# Patient Record
Sex: FEMALE | Race: WHITE | NOT HISPANIC OR LATINO | Employment: OTHER | URBAN - METROPOLITAN AREA
[De-identification: names, ages, dates, MRNs, and addresses within clinical notes are randomized per-mention and may not be internally consistent; named-entity substitution may affect disease eponyms.]

---

## 2017-01-16 PROBLEM — Z79.899 DRUG THERAPY: Status: ACTIVE | Noted: 2017-01-16

## 2017-01-31 RX ORDER — PHENOBARBITAL 32.4 MG/1
TABLET ORAL
Qty: 30 TABLET | Refills: 0 | OUTPATIENT
Start: 2017-01-31

## 2017-01-31 RX ORDER — PHENOBARBITAL 97.2 MG/1
TABLET ORAL
Qty: 30 TABLET | Refills: 0 | OUTPATIENT
Start: 2017-01-31

## 2017-03-01 RX ORDER — PHENOBARBITAL 32.4 MG/1
TABLET ORAL
Qty: 30 TABLET | Refills: 0 | OUTPATIENT
Start: 2017-03-01

## 2017-03-01 RX ORDER — PHENOBARBITAL 97.2 MG/1
TABLET ORAL
OUTPATIENT
Start: 2017-03-01

## 2017-03-28 RX ORDER — PHENOBARBITAL 97.2 MG/1
TABLET ORAL
Qty: 30 TABLET | Refills: 0 | OUTPATIENT
Start: 2017-03-28

## 2017-03-28 RX ORDER — LEVOTHYROXINE SODIUM 0.07 MG/1
TABLET ORAL
Qty: 30 TABLET | Refills: 0 | Status: SHIPPED | OUTPATIENT
Start: 2017-03-28 | End: 2017-04-05 | Stop reason: SDUPTHER

## 2017-03-28 RX ORDER — PHENOBARBITAL 32.4 MG/1
TABLET ORAL
Qty: 30 TABLET | Refills: 0 | OUTPATIENT
Start: 2017-03-28

## 2017-03-30 ENCOUNTER — TELEPHONE (OUTPATIENT)
Dept: FAMILY MEDICINE CLINIC | Facility: CLINIC | Age: 47
End: 2017-03-30

## 2017-04-02 NOTE — TELEPHONE ENCOUNTER
Ask her mother if there are any forms that need to be filled out by me. I can see her even if there are no forms. But is it very difficult as we all know.

## 2017-04-05 RX ORDER — LEVOTHYROXINE SODIUM 0.07 MG/1
75 TABLET ORAL DAILY
Qty: 90 TABLET | Refills: 10 | Status: SHIPPED | OUTPATIENT
Start: 2017-04-05 | End: 2018-05-17 | Stop reason: SDUPTHER

## 2017-04-21 ENCOUNTER — TELEPHONE (OUTPATIENT)
Dept: FAMILY MEDICINE CLINIC | Facility: CLINIC | Age: 47
End: 2017-04-21

## 2017-04-21 ENCOUNTER — OFFICE VISIT (OUTPATIENT)
Dept: FAMILY MEDICINE CLINIC | Facility: CLINIC | Age: 47
End: 2017-04-21

## 2017-04-21 DIAGNOSIS — G80.9 CEREBRAL PALSY, UNSPECIFIED TYPE (HCC): ICD-10-CM

## 2017-04-21 DIAGNOSIS — R56.9 SEIZURE (HCC): ICD-10-CM

## 2017-04-21 DIAGNOSIS — Q02 MICROCEPHALIC (HCC): ICD-10-CM

## 2017-04-21 DIAGNOSIS — E03.9 ACQUIRED HYPOTHYROIDISM: Primary | ICD-10-CM

## 2017-04-21 DIAGNOSIS — N91.2 AMENORRHEA: ICD-10-CM

## 2017-04-21 PROCEDURE — 99213 OFFICE O/P EST LOW 20 MIN: CPT | Performed by: FAMILY MEDICINE

## 2017-04-21 NOTE — TELEPHONE ENCOUNTER
----- Message from Yvonne Casarez sent at 4/21/2017  3:33 PM EDT -----  PATIENT'S LAST 3 CYCLES  MARCH 3-DAY 1   MARCH 24-DAY 1  APRIL 11-DAY 1

## 2017-04-21 NOTE — PROGRESS NOTES
Subjective   Nallely Ochoa is a 46 y.o. female.     History of Present Illness Here with sister Anay.  Menses are closer together 18-22 days apart. Last 3-4 days. Only heavy for one day./ Menarche age 14+.    Still will not let health care or other providers near her. But now will give a hug when it is time to leave.    The following portions of the patient's history were reviewed and updated as appropriate: allergies, current medications, past social history and problem list.    Review of Systems   Constitutional: Negative for activity change, appetite change and unexpected weight change.   HENT: Negative for nosebleeds and trouble swallowing.    Eyes: Negative for pain and visual disturbance.   Respiratory: Negative for chest tightness, shortness of breath and wheezing.    Cardiovascular: Negative for chest pain and palpitations.   Gastrointestinal: Negative for abdominal pain and blood in stool.   Endocrine: Negative.    Genitourinary: Negative for difficulty urinating and hematuria.        Menses now 3 weeks apart rather than 4.   Musculoskeletal: Negative for joint swelling.   Skin: Negative for color change and rash.   Allergic/Immunologic: Negative.    Neurological: Positive for seizures and speech difficulty (nonverbal entire life). Negative for syncope.   Hematological: Negative for adenopathy.   Psychiatric/Behavioral: Positive for agitation, behavioral problems, confusion, decreased concentration, dysphoric mood and self-injury (only when upset, not new). The patient is nervous/anxious.    All other systems reviewed and are negative.      Objective   There were no vitals taken for this visit.  Physical Exam   Constitutional: She appears well-developed and well-nourished.   Shrieking, rocking, hitting herself on the head. Nonverbal.   Cardiovascular: Normal rate, regular rhythm and normal heart sounds.    Pulmonary/Chest: Effort normal and breath sounds normal.   Neurological:   Nonverbal. Difficult  to control for phlebotomy so very little blood taken overall.   Psychiatric:   Severely frightened and angry entire visit other than 20 seconds when she managed to give a hug.   Nursing note and vitals reviewed.      Assessment/Plan   Problem List Items Addressed This Visit        Endocrine    Hypothyroidism - Primary       Nervous and Auditory    Cerebral palsy    Seizure    Microcephalic      Other Visit Diagnoses     Amenorrhea        Relevant Orders    Estrogens, Fractionated    FSH & LH           Really she has short menstrual cycle and could be nearing menopause.  She does not have amenorrhea.  Mother does an amazing job taking care of Nallely.

## 2017-04-24 ENCOUNTER — TELEPHONE (OUTPATIENT)
Dept: FAMILY MEDICINE CLINIC | Facility: CLINIC | Age: 47
End: 2017-04-24

## 2017-04-24 LAB
ESTRADIOL SERPL-MCNC: 514.7 PG/ML
ESTRONE SERPL-MCNC: 57 PG/ML
FSH SERPL-ACNC: 5.4 MIU/ML
LH SERPL-ACNC: 4.9 MIU/ML

## 2017-04-24 NOTE — TELEPHONE ENCOUNTER
Spoke with pts mother that the estrogen level is slightly higher than the normal range. Not common at any age, carlo age 46. Tomorrow I will speak with mother's gyne, Dr Carla Helms, and see what can and should be done. May need D and C. Dr Helms is not Nallely';s gyne at present, and may refer me elsewhere.

## 2017-04-25 DIAGNOSIS — N92.1 METRORRHAGIA: Primary | ICD-10-CM

## 2017-04-26 RX ORDER — PHENOBARBITAL 97.2 MG/1
TABLET ORAL
Qty: 30 TABLET | Refills: 0 | OUTPATIENT
Start: 2017-04-26

## 2017-04-26 RX ORDER — PHENOBARBITAL 32.4 MG/1
TABLET ORAL
Qty: 30 TABLET | Refills: 0 | OUTPATIENT
Start: 2017-04-26

## 2017-05-01 ENCOUNTER — TELEPHONE (OUTPATIENT)
Dept: OBSTETRICS AND GYNECOLOGY | Facility: CLINIC | Age: 47
End: 2017-05-01

## 2017-05-20 RX ORDER — PHENYTOIN 50 MG/1
TABLET, CHEWABLE ORAL
Qty: 90 TABLET | Refills: 0 | OUTPATIENT
Start: 2017-05-20

## 2017-05-22 RX ORDER — PHENYTOIN 50 MG/1
50 TABLET, CHEWABLE ORAL DAILY
Qty: 90 TABLET | Refills: 0 | Status: SHIPPED | OUTPATIENT
Start: 2017-05-22 | End: 2017-08-16 | Stop reason: SDUPTHER

## 2017-05-30 RX ORDER — PHENOBARBITAL 97.2 MG/1
TABLET ORAL
Qty: 30 TABLET | Refills: 0 | Status: SHIPPED | OUTPATIENT
Start: 2017-05-30 | End: 2017-06-24 | Stop reason: SDUPTHER

## 2017-05-30 RX ORDER — PHENOBARBITAL 32.4 MG/1
TABLET ORAL
Qty: 30 TABLET | Refills: 0 | Status: SHIPPED | OUTPATIENT
Start: 2017-05-30 | End: 2017-06-24 | Stop reason: SDUPTHER

## 2017-06-26 RX ORDER — PHENOBARBITAL 97.2 MG/1
TABLET ORAL
Qty: 30 TABLET | Refills: 0 | Status: SHIPPED | OUTPATIENT
Start: 2017-06-26 | End: 2018-01-23 | Stop reason: SDUPTHER

## 2017-06-26 RX ORDER — PHENOBARBITAL 32.4 MG/1
TABLET ORAL
Qty: 30 TABLET | Refills: 0 | Status: SHIPPED | OUTPATIENT
Start: 2017-06-26 | End: 2018-01-23 | Stop reason: SDUPTHER

## 2017-07-07 ENCOUNTER — OFFICE VISIT (OUTPATIENT)
Dept: OBSTETRICS AND GYNECOLOGY | Facility: CLINIC | Age: 47
End: 2017-07-07

## 2017-07-07 VITALS — HEIGHT: 59 IN | WEIGHT: 125 LBS | BODY MASS INDEX: 25.2 KG/M2

## 2017-07-07 DIAGNOSIS — G80.9 CEREBRAL PALSY, UNSPECIFIED TYPE (HCC): ICD-10-CM

## 2017-07-07 DIAGNOSIS — N93.9 ABNORMAL UTERINE BLEEDING: Primary | ICD-10-CM

## 2017-07-07 PROCEDURE — 99203 OFFICE O/P NEW LOW 30 MIN: CPT | Performed by: OBSTETRICS & GYNECOLOGY

## 2017-07-07 NOTE — PROGRESS NOTES
Subjective   Nallely Ochoa is a 47 y.o. female     History of Present Illness  CC: NP here for irregular bleeding.     Patient is a 47-year-old G0 with a history of cerebral palsy that presents with an approximately 6-7 month history of abnormal uterine bleeding.  History is given by her mother.  Patient's mother states that she has noticed that her periods have become closer together since December.  She denies that the bleeding is heavy and states that this typically 3-4 days of light bleeding.  The patient's mother cares for her due to the patient's history of cerebral palsy and being wheelchair bound.  The mother states that hygiene during her periods is not an issue.  She denies that the patient complains of pain.  The patient has never been sexually active.  She states that she has had one pelvic exam under anesthesia in the past by her primary care provider.  LH and FSH were recently done by her primary care provider and do not indicate ovarian failure.  Her estradiol was noted to be high.    The following portions of the patient's history were reviewed and updated as appropriate: allergies, current medications, past family history, past medical history, past social history, past surgical history and problem list.    Review of Systems   Reason unable to perform ROS: Patient has cerebral palsy.   Genitourinary: Positive for menstrual problem.       Objective   Physical Exam   Vitals reviewed.   unable to perform physical exam due to patient's anxiety      Assessment/Plan   Diagnoses and all orders for this visit:    Abnormal uterine bleeding  -     Case Request    Cerebral palsy, unspecified type      Discussed with patient's mother recommendations to proceed with exam under anesthesia and endometrial biopsy.  Explained that biopsy is recommended after the age of 45 for abnormal bleeding to rule out any hyperplasia or cancer.  Recommend proceeding with exam under anesthesia, transvaginal ultrasound, Pap  smear, D&C, and possible hysteroscopy.  Discussed risk of the procedure with the patient's mother including risks for infection, bleeding, uterine perforation with damage to surrounding structures, need for additional surgery if uterine perforation occurs, risk of anesthesia, blood clots, heart attack, and stroke.  Patient's mother states that she has done well with anesthesia for her past procedures.  All questions were answered today and patient's mother agrees to proceed with the procedure.

## 2017-07-10 ENCOUNTER — TRANSCRIBE ORDERS (OUTPATIENT)
Dept: ADMINISTRATIVE | Facility: HOSPITAL | Age: 47
End: 2017-07-10

## 2017-07-10 DIAGNOSIS — N93.9 ABNORMAL UTERINE BLEEDING: Primary | ICD-10-CM

## 2017-07-14 ENCOUNTER — APPOINTMENT (OUTPATIENT)
Dept: PREADMISSION TESTING | Facility: HOSPITAL | Age: 47
End: 2017-07-14

## 2017-07-18 PROBLEM — N93.9 ABNORMAL UTERINE BLEEDING: Status: ACTIVE | Noted: 2017-07-18

## 2017-07-19 ENCOUNTER — HOSPITAL ENCOUNTER (OUTPATIENT)
Facility: HOSPITAL | Age: 47
Setting detail: HOSPITAL OUTPATIENT SURGERY
Discharge: HOME | End: 2017-07-19
Attending: OBSTETRICS & GYNECOLOGY | Admitting: OBSTETRICS & GYNECOLOGY

## 2017-07-19 ENCOUNTER — ANESTHESIA (OUTPATIENT)
Dept: PERIOP | Facility: HOSPITAL | Age: 47
End: 2017-07-19

## 2017-07-19 ENCOUNTER — ANESTHESIA EVENT (OUTPATIENT)
Dept: PERIOP | Facility: HOSPITAL | Age: 47
End: 2017-07-19

## 2017-07-19 ENCOUNTER — HOSPITAL ENCOUNTER (OUTPATIENT)
Dept: ULTRASOUND IMAGING | Facility: HOSPITAL | Age: 47
Discharge: HOME | End: 2017-07-19
Attending: OBSTETRICS & GYNECOLOGY

## 2017-07-19 VITALS
OXYGEN SATURATION: 98 % | BODY MASS INDEX: 26.21 KG/M2 | SYSTOLIC BLOOD PRESSURE: 149 MMHG | HEART RATE: 92 BPM | HEIGHT: 59 IN | TEMPERATURE: 97.8 F | RESPIRATION RATE: 16 BRPM | DIASTOLIC BLOOD PRESSURE: 97 MMHG | WEIGHT: 130 LBS

## 2017-07-19 DIAGNOSIS — N93.9 ABNORMAL UTERINE BLEEDING: ICD-10-CM

## 2017-07-19 DIAGNOSIS — N93.9 ABNORMAL UTERINE BLEEDING: Primary | ICD-10-CM

## 2017-07-19 PROCEDURE — 57500 BIOPSY OF CERVIX: CPT | Performed by: OBSTETRICS & GYNECOLOGY

## 2017-07-19 PROCEDURE — 88305 TISSUE EXAM BY PATHOLOGIST: CPT | Performed by: OBSTETRICS & GYNECOLOGY

## 2017-07-19 PROCEDURE — 25010000002 MIDAZOLAM PER 1 MG: Performed by: ANESTHESIOLOGY

## 2017-07-19 PROCEDURE — 25010000002 PROPOFOL 10 MG/ML EMULSION: Performed by: NURSE ANESTHETIST, CERTIFIED REGISTERED

## 2017-07-19 PROCEDURE — 25010000002 ONDANSETRON PER 1 MG: Performed by: NURSE ANESTHETIST, CERTIFIED REGISTERED

## 2017-07-19 PROCEDURE — S0260 H&P FOR SURGERY: HCPCS | Performed by: OBSTETRICS & GYNECOLOGY

## 2017-07-19 PROCEDURE — 25010000002 PROMETHAZINE PER 50 MG: Performed by: ANESTHESIOLOGY

## 2017-07-19 PROCEDURE — 76830 TRANSVAGINAL US NON-OB: CPT

## 2017-07-19 PROCEDURE — 58558 HYSTEROSCOPY BIOPSY: CPT | Performed by: OBSTETRICS & GYNECOLOGY

## 2017-07-19 RX ORDER — PROMETHAZINE HYDROCHLORIDE 25 MG/1
25 TABLET ORAL ONCE AS NEEDED
Status: DISCONTINUED | OUTPATIENT
Start: 2017-07-19 | End: 2017-07-19 | Stop reason: HOSPADM

## 2017-07-19 RX ORDER — PROMETHAZINE HYDROCHLORIDE 25 MG/ML
12.5 INJECTION, SOLUTION INTRAMUSCULAR; INTRAVENOUS ONCE AS NEEDED
Status: DISCONTINUED | OUTPATIENT
Start: 2017-07-19 | End: 2017-07-19 | Stop reason: HOSPADM

## 2017-07-19 RX ORDER — PROMETHAZINE HYDROCHLORIDE 25 MG/1
12.5 SUPPOSITORY RECTAL ONCE
Status: COMPLETED | OUTPATIENT
Start: 2017-07-19 | End: 2017-07-19

## 2017-07-19 RX ORDER — FLUMAZENIL 0.1 MG/ML
0.2 INJECTION INTRAVENOUS AS NEEDED
Status: DISCONTINUED | OUTPATIENT
Start: 2017-07-19 | End: 2017-07-19 | Stop reason: HOSPADM

## 2017-07-19 RX ORDER — HYDRALAZINE HYDROCHLORIDE 20 MG/ML
5 INJECTION INTRAMUSCULAR; INTRAVENOUS
Status: DISCONTINUED | OUTPATIENT
Start: 2017-07-19 | End: 2017-07-19 | Stop reason: HOSPADM

## 2017-07-19 RX ORDER — HYDROCODONE BITARTRATE AND ACETAMINOPHEN 7.5; 325 MG/1; MG/1
1 TABLET ORAL ONCE AS NEEDED
Status: DISCONTINUED | OUTPATIENT
Start: 2017-07-19 | End: 2017-07-19 | Stop reason: HOSPADM

## 2017-07-19 RX ORDER — KETAMINE HYDROCHLORIDE 50 MG/ML
250 INJECTION, SOLUTION, CONCENTRATE INTRAMUSCULAR; INTRAVENOUS ONCE
Status: COMPLETED | OUTPATIENT
Start: 2017-07-19 | End: 2017-07-19

## 2017-07-19 RX ORDER — ONDANSETRON 2 MG/ML
INJECTION INTRAMUSCULAR; INTRAVENOUS AS NEEDED
Status: DISCONTINUED | OUTPATIENT
Start: 2017-07-19 | End: 2017-07-19 | Stop reason: SURG

## 2017-07-19 RX ORDER — HYDROMORPHONE HYDROCHLORIDE 1 MG/ML
0.5 INJECTION, SOLUTION INTRAMUSCULAR; INTRAVENOUS; SUBCUTANEOUS
Status: DISCONTINUED | OUTPATIENT
Start: 2017-07-19 | End: 2017-07-19 | Stop reason: HOSPADM

## 2017-07-19 RX ORDER — MIDAZOLAM HYDROCHLORIDE 1 MG/ML
1 INJECTION INTRAMUSCULAR; INTRAVENOUS
Status: DISCONTINUED | OUTPATIENT
Start: 2017-07-19 | End: 2017-07-19 | Stop reason: HOSPADM

## 2017-07-19 RX ORDER — SODIUM CHLORIDE 9 MG/ML
INJECTION, SOLUTION INTRAVENOUS AS NEEDED
Status: DISCONTINUED | OUTPATIENT
Start: 2017-07-19 | End: 2017-07-19 | Stop reason: HOSPADM

## 2017-07-19 RX ORDER — PROMETHAZINE HYDROCHLORIDE 25 MG/1
12.5 TABLET ORAL ONCE
Status: COMPLETED | OUTPATIENT
Start: 2017-07-19 | End: 2017-07-19

## 2017-07-19 RX ORDER — LABETALOL HYDROCHLORIDE 5 MG/ML
5 INJECTION, SOLUTION INTRAVENOUS
Status: DISCONTINUED | OUTPATIENT
Start: 2017-07-19 | End: 2017-07-19 | Stop reason: HOSPADM

## 2017-07-19 RX ORDER — FAMOTIDINE 10 MG/ML
20 INJECTION, SOLUTION INTRAVENOUS
Status: DISCONTINUED | OUTPATIENT
Start: 2017-07-19 | End: 2017-07-19 | Stop reason: HOSPADM

## 2017-07-19 RX ORDER — SODIUM CHLORIDE 0.9 % (FLUSH) 0.9 %
1-10 SYRINGE (ML) INJECTION AS NEEDED
Status: DISCONTINUED | OUTPATIENT
Start: 2017-07-19 | End: 2017-07-19 | Stop reason: HOSPADM

## 2017-07-19 RX ORDER — PROMETHAZINE HYDROCHLORIDE 25 MG/ML
2.5 INJECTION, SOLUTION INTRAMUSCULAR; INTRAVENOUS
Status: DISCONTINUED | OUTPATIENT
Start: 2017-07-19 | End: 2017-07-19 | Stop reason: HOSPADM

## 2017-07-19 RX ORDER — MIDAZOLAM HYDROCHLORIDE 1 MG/ML
2 INJECTION INTRAMUSCULAR; INTRAVENOUS
Status: DISCONTINUED | OUTPATIENT
Start: 2017-07-19 | End: 2017-07-19 | Stop reason: HOSPADM

## 2017-07-19 RX ORDER — PROPOFOL 10 MG/ML
VIAL (ML) INTRAVENOUS AS NEEDED
Status: DISCONTINUED | OUTPATIENT
Start: 2017-07-19 | End: 2017-07-19 | Stop reason: SURG

## 2017-07-19 RX ORDER — MAGNESIUM HYDROXIDE 1200 MG/15ML
LIQUID ORAL AS NEEDED
Status: DISCONTINUED | OUTPATIENT
Start: 2017-07-19 | End: 2017-07-19 | Stop reason: HOSPADM

## 2017-07-19 RX ORDER — DIPHENHYDRAMINE HYDROCHLORIDE 50 MG/ML
12.5 INJECTION INTRAMUSCULAR; INTRAVENOUS
Status: DISCONTINUED | OUTPATIENT
Start: 2017-07-19 | End: 2017-07-19 | Stop reason: HOSPADM

## 2017-07-19 RX ORDER — EPHEDRINE SULFATE 50 MG/ML
5 INJECTION, SOLUTION INTRAVENOUS ONCE AS NEEDED
Status: DISCONTINUED | OUTPATIENT
Start: 2017-07-19 | End: 2017-07-19 | Stop reason: HOSPADM

## 2017-07-19 RX ORDER — PROMETHAZINE HYDROCHLORIDE 25 MG/1
12.5 TABLET ORAL ONCE AS NEEDED
Status: DISCONTINUED | OUTPATIENT
Start: 2017-07-19 | End: 2017-07-19 | Stop reason: HOSPADM

## 2017-07-19 RX ORDER — NALOXONE HCL 0.4 MG/ML
0.2 VIAL (ML) INJECTION AS NEEDED
Status: DISCONTINUED | OUTPATIENT
Start: 2017-07-19 | End: 2017-07-19 | Stop reason: HOSPADM

## 2017-07-19 RX ORDER — FENTANYL CITRATE 50 UG/ML
50 INJECTION, SOLUTION INTRAMUSCULAR; INTRAVENOUS
Status: DISCONTINUED | OUTPATIENT
Start: 2017-07-19 | End: 2017-07-19 | Stop reason: HOSPADM

## 2017-07-19 RX ORDER — OXYCODONE AND ACETAMINOPHEN 7.5; 325 MG/1; MG/1
1 TABLET ORAL ONCE AS NEEDED
Status: DISCONTINUED | OUTPATIENT
Start: 2017-07-19 | End: 2017-07-19 | Stop reason: HOSPADM

## 2017-07-19 RX ORDER — IBUPROFEN 600 MG/1
600 TABLET ORAL EVERY 6 HOURS PRN
Status: DISCONTINUED | OUTPATIENT
Start: 2017-07-19 | End: 2017-07-19 | Stop reason: HOSPADM

## 2017-07-19 RX ORDER — PROMETHAZINE HYDROCHLORIDE 25 MG/ML
12.5 INJECTION, SOLUTION INTRAMUSCULAR; INTRAVENOUS ONCE
Status: COMPLETED | OUTPATIENT
Start: 2017-07-19 | End: 2017-07-19

## 2017-07-19 RX ORDER — PROMETHAZINE HYDROCHLORIDE 25 MG/1
25 SUPPOSITORY RECTAL ONCE AS NEEDED
Status: DISCONTINUED | OUTPATIENT
Start: 2017-07-19 | End: 2017-07-19 | Stop reason: HOSPADM

## 2017-07-19 RX ORDER — SODIUM CHLORIDE, SODIUM LACTATE, POTASSIUM CHLORIDE, CALCIUM CHLORIDE 600; 310; 30; 20 MG/100ML; MG/100ML; MG/100ML; MG/100ML
9 INJECTION, SOLUTION INTRAVENOUS CONTINUOUS PRN
Status: DISCONTINUED | OUTPATIENT
Start: 2017-07-19 | End: 2017-07-19 | Stop reason: HOSPADM

## 2017-07-19 RX ORDER — ONDANSETRON 2 MG/ML
4 INJECTION INTRAMUSCULAR; INTRAVENOUS ONCE AS NEEDED
Status: DISCONTINUED | OUTPATIENT
Start: 2017-07-19 | End: 2017-07-19 | Stop reason: HOSPADM

## 2017-07-19 RX ORDER — KETAMINE HYDROCHLORIDE 50 MG/ML
200 INJECTION, SOLUTION, CONCENTRATE INTRAMUSCULAR; INTRAVENOUS ONCE
Status: COMPLETED | OUTPATIENT
Start: 2017-07-19 | End: 2017-07-19

## 2017-07-19 RX ADMIN — SODIUM CHLORIDE, POTASSIUM CHLORIDE, SODIUM LACTATE AND CALCIUM CHLORIDE 9 ML/HR: 600; 310; 30; 20 INJECTION, SOLUTION INTRAVENOUS at 07:36

## 2017-07-19 RX ADMIN — FAMOTIDINE 20 MG: 10 INJECTION INTRAVENOUS at 07:39

## 2017-07-19 RX ADMIN — MIDAZOLAM 1 MG: 1 INJECTION INTRAMUSCULAR; INTRAVENOUS at 08:03

## 2017-07-19 RX ADMIN — MIDAZOLAM 1 MG: 1 INJECTION INTRAMUSCULAR; INTRAVENOUS at 08:07

## 2017-07-19 RX ADMIN — ONDANSETRON 4 MG: 2 INJECTION INTRAMUSCULAR; INTRAVENOUS at 10:05

## 2017-07-19 RX ADMIN — MIDAZOLAM 1 MG: 1 INJECTION INTRAMUSCULAR; INTRAVENOUS at 07:51

## 2017-07-19 RX ADMIN — KETAMINE HYDROCHLORIDE 250 MG: 50 INJECTION, SOLUTION INTRAMUSCULAR; INTRAVENOUS at 07:18

## 2017-07-19 RX ADMIN — MIDAZOLAM 1 MG: 1 INJECTION INTRAMUSCULAR; INTRAVENOUS at 07:37

## 2017-07-19 RX ADMIN — PROMETHAZINE HYDROCHLORIDE 12.5 MG: 25 INJECTION, SOLUTION INTRAMUSCULAR; INTRAVENOUS at 08:21

## 2017-07-19 RX ADMIN — KETAMINE HYDROCHLORIDE 200 MG: 50 INJECTION, SOLUTION INTRAMUSCULAR; INTRAVENOUS at 09:02

## 2017-07-19 RX ADMIN — PROPOFOL 150 MG: 10 INJECTION, EMULSION INTRAVENOUS at 09:27

## 2017-07-19 RX ADMIN — PROPOFOL 50 MG: 10 INJECTION, EMULSION INTRAVENOUS at 09:30

## 2017-07-19 NOTE — ANESTHESIA PROCEDURE NOTES
Airway  Urgency: elective    Date/Time: 7/19/2017 9:28 AM  Airway not difficult    General Information and Staff    Patient location during procedure: OR  Anesthesiologist: SHELBY PRINCE  CRNA: CHITO YING    Indications and Patient Condition  Indications for airway management: airway protection    Preoxygenated: yes  Mask difficulty assessment: 1 - vent by mask    Final Airway Details  Final airway type: supraglottic airway      Successful airway: unique  Size 3    Number of attempts at approach: 1

## 2017-07-19 NOTE — PERIOPERATIVE NURSING NOTE
lpatient anxious and mother at bedside stated getting her home the fastest way possible is what she is requesting

## 2017-07-19 NOTE — PERIOPERATIVE NURSING NOTE
"PT VERY AGITATED  AND UNCOOPERATIVE WITH ALL CARE. DR. PRINCE HERE. ADMINISTERED \"250 MG\" OF KETAMINE RIGHT THIGH IM. MOTHER AT PATIENT'S SIDE. STATED \"NO LABS, INCLUDING SERUM HCG\".  "

## 2017-07-19 NOTE — PERIOPERATIVE NURSING NOTE
Sr. Persaud did papsmear and took specimen to office with her to send for testing from her office.

## 2017-07-19 NOTE — OP NOTE
Exam under anesthesia, Pap smear, transvaginal ultrasound, hysteroscopy, Polypectomy, D&C     Preoperative Diagnosis:  1. Abnormal uterine bleeding  2. Cerebral palsy  3. Intolerance to office pelvic exams      Postoperative Diagnosis:  Same  +  4. Cervical polyp    Indications: The patient was admitted to the hospital with a 6-7 month history of abnormal uterine bleeding.  Patient has a history of cerebral palsy and is wheelchair bound and cannot tolerate a pelvic examination the office.  It was recommended to proceed with exam under anesthesia for further evaluation of her bleeding.          Surgeon: Irlanda Persaud MD     Assistants: None    Anesthesia: General endotracheal anesthesia    Procedure Details   Patient was taken to the operating room and placed under general anesthesia without difficulty.  She was placed in the dorsal lithotomy position.  Exam under anesthesia revealed a small, mobile retroverted uterus with normal adnexa.  A pediatric speculum was placed in the vagina and the cervix was visualized.  A prolapsing cervical polyp was noted.  Pap smear was performed.  The speculum was removed and at this time a transvaginal ultrasound was performed by radiology.  Please see separate radiology report for results of ultrasound.  The patient was then prepped and draped in the normal sterile fashion and bladder was drained with a red Gregorio catheter.  Two right angle retractors were placed in the vagina for visualization of the cervix.  The anterior lip was grasped with a long Allis clamp.  The cervical polyp was grasped with a ring forcep and removed intact. This was sent to pathology. The patient's cervix was then serially dilated to a Dilan dilator size 16.  The diagnostic hysteroscope was advanced into the uterus and the uterus was insufflated with normal saline.  Visualization of the uterine cavity revealed a normal cavity.  Bilateral ostia were visualized.  The hysteroscope was then removed.  An  endocervical curettage was performed with a Kevorkian curette.  This was sent to pathology.  An endometrial curette was then performed in all 4 quadrants until a gritty texture was noted.  Specimen was sent separately to pathology.  The Allis clamp was removed from the patient's cervix.  Her cervix was hemostatic.  All instruments were removed from her vagina and hemostasis was observed.  Patient tolerated the procedure well.  All counts were correct and patient was transferred to recovery room in stable condition.    Findings:  Prolapsing cervical polyp  Small, retroverted uterus  3 cm posterior fibroid was seen on transvaginal ultrasound    Estimated Blood Loss:  5 mL           Drains: None           Specimens:   ID Type Source Tests Collected by Time Destination   A : endometrial curettings Tissue Endometrial Curettings TISSUE EXAM Irlanda Persaud MD 7/19/2017 1003    B : uterine polyp Polyp Uterus TISSUE EXAM Irlanda Persaud MD 7/19/2017 1004    C : endocervical curettage Tissue Cervix TISSUE EXAM Irlanda Persaud MD 7/19/2017 1010             Complications:  None           Disposition: PACU - hemodynamically stable.           Condition: stable

## 2017-07-19 NOTE — PERIOPERATIVE NURSING NOTE
PT VERY AGITATED TRYING TO GET OUT OF THE BED. UNCOOPERATIVE AND COMBATIVE. DR. PRINCE HERE. GAVE PT 25 MG KETAMINE IV THEN  GAVE 200 MG KETAMINE RIGHT THIGH IM.

## 2017-07-19 NOTE — PERIOPERATIVE NURSING NOTE
PT MUCH MORE RELAXED AND COOPERATIVE WITH CARE. IV CLOTTED OFF. DC'D WITH PRESSURE DRESSING APPLIED. IV RESTARTED LEFT HAND WITHOUT INCIDENT

## 2017-07-19 NOTE — PLAN OF CARE
Problem: Patient Care Overview (Adult)  Goal: Plan of Care Review  Outcome: Outcome(s) achieved Date Met:  07/19/17  Goal: Adult Individualization and Mutuality  Outcome: Outcome(s) achieved Date Met:  07/19/17  Goal: Discharge Needs Assessment  Outcome: Outcome(s) achieved Date Met:  07/19/17    Problem: Perioperative Period (Adult)  Goal: Signs and Symptoms of Listed Potential Problems Will be Absent or Manageable (Perioperative Period)  Outcome: Outcome(s) achieved Date Met:  07/19/17

## 2017-07-19 NOTE — PERIOPERATIVE NURSING NOTE
PT MORE AWAKE AND RESTLESS. DR. PRINCE MADE AWARE. PHENERGAN 12.5 MG IM ORDERED. GIVEN PER LVG.. MONITORING ONGOING

## 2017-07-19 NOTE — ANESTHESIA PREPROCEDURE EVALUATION
Anesthesia Evaluation     Patient summary reviewed          Airway   no difficulty expected  Dental - normal exam     Pulmonary    Cardiovascular     Rhythm: regular        Neuro/Psych  (+) seizures well controlled,      ROS Comment: Cerebral palsy  GI/Hepatic/Renal/Endo    (+)  hypothyroidism,     Musculoskeletal     Abdominal    Substance History      OB/GYN          Other                                        Anesthesia Plan    ASA 3     general     Anesthetic plan and risks discussed with patient.  Use of blood products discussed with patient .

## 2017-07-19 NOTE — H&P
Patient Care Team:  Yas Tee MD as PCP - General    Chief complaint Abnormal uterine bleeding    Subjective     Patient is a 47 y.o. female with cerebral palsy who has a 6-7 month of abnormal uterine bleeding and bleeding episodes have become for frequent.  Patient cannot tolerate pelvic exam in the office due to her disabilities and recommendations are to proceed with exam under anesthesia for evaluation of bleeding.    Review of Systems   Pertinent items are noted in HPI, all other systems reviewed and negative    History  Past Medical History:   Diagnosis Date   • Cerebral palsy, diplegic, infantile    • Disease of thyroid gland    • Menstrual periods irregular    • Non-verbal learning disorder    • Seizure     AT LEAST 20 YEARS AGO....  ON MEDS     Past Surgical History:   Procedure Laterality Date   • ADENOIDECTOMY     • CATARACT EXTRACTION Left    • HEEL SPUR SURGERY     • INNER EAR SURGERY     • TEETH EXTRACTION     • TONSILLECTOMY       Family History   Problem Relation Age of Onset   • Malig Hyperthermia Neg Hx      Social History   Substance Use Topics   • Smoking status: Never Smoker   • Smokeless tobacco: None   • Alcohol use No     Prescriptions Prior to Admission   Medication Sig Dispense Refill Last Dose   • levothyroxine (SYNTHROID, LEVOTHROID) 75 MCG tablet Take 1 tablet by mouth Daily. 90 tablet 10 7/19/2017 at 0515   • loratadine (CLARITIN) 10 MG tablet Take 10 mg by mouth Every Night.   7/18/2017 at 1900   • Multiple Vitamins-Minerals (MULTI-VITAMIN GUMMIES PO) Take 1 tablet by mouth Daily.   7/18/2017 at 0800   • PHENobarbital (LUMINAL) 32.4 MG tablet TAKE ONE TABLET BY MOUTH DAILY (Patient taking differently: TAKE ONE TABLET BY MOUTH AT NIGHT) 30 tablet 0 7/18/2017 at 1900   • PHENobarbital (LUMINAL) 97.2 MG tablet TAKE ONE TABLET BY MOUTH DAILY (Patient taking differently: TAKE ONE TABLET BY MOUTH AT NIGHT) 30 tablet 0 7/18/2017 at 1900   • phenytoin (DILANTIN INFATABS) 50 MG  chewable tablet Chew 1 tablet Daily. (Patient taking differently: Chew 50 mg Every Night.) 90 tablet 0 7/18/2017 at 1900     Allergies:  Review of patient's allergies indicates no known allergies.    Objective     Vital Signs       Physical Exam:    Unable to perform physical exam due to patient's CP    Results Review:    I reviewed the patient's new clinical results.    Assessment/Plan     Principal Problem:    Abnormal uterine bleeding      48 yo G0 with abnormal uterine bleeding    Plan to proceed with exam under anesthesia, transvaginal ultrasound, Pap smear, D&C, and possible hysteroscopy.  Discussed risks of the procedure with the patient's mother including risks for infection, bleeding, uterine perforation with damage to surrounding structures, need for additional surgery if uterine perforation occurs, risk of anesthesia, blood clots, heart attack, and stroke.    I discussed the patients findings and my recommendations with family.     Irlanda Persaud MD  07/19/17  7:33 AM

## 2017-07-19 NOTE — ANESTHESIA POSTPROCEDURE EVALUATION
Patient: Nallely Ochoa    Procedure Summary     Date Anesthesia Start Anesthesia Stop Room / Location    07/19/17 0926 1035  [facility] /  [facility]       Procedure Diagnosis Surgeon Provider    EXAM UNDER ANESTHESIA, TRANSVAGINAL ULTRASOUND, PAP SMEAR, DILATATION AND CURETTAGE HYSTEROSCOPY and polypectomy (N/A Uterus) Abnormal uterine bleeding  (Abnormal uterine bleeding [N93.9]) MD Eugene Sanchez MD          Anesthesia Type: general  Last vitals  BP   149/97 (07/19/17 1115)    Temp   36.6 °C (97.8 °F) (07/19/17 1055)    Pulse   92 (07/19/17 1115)   Resp   16 (07/19/17 1115)    SpO2   98 % (07/19/17 1115)      Post Anesthesia Care and Evaluation    Patient location during evaluation: PHASE II  Patient participation: complete - patient participated  Level of consciousness: awake  Pain score: 1  Pain management: adequate  Airway patency: patent  Anesthetic complications: No anesthetic complications  PONV Status: none  Cardiovascular status: acceptable  Respiratory status: acceptable  Hydration status: acceptable

## 2017-07-20 LAB
CYTO UR: NORMAL
LAB AP CASE REPORT: NORMAL
LAB AP INTRADEPARTMENTAL CONSULT: NORMAL
Lab: NORMAL
PATH REPORT.FINAL DX SPEC: NORMAL
PATH REPORT.GROSS SPEC: NORMAL

## 2017-07-24 LAB
CYTOLOGIST CVX/VAG CYTO: NORMAL
CYTOLOGY CVX/VAG DOC THIN PREP: NORMAL
DX ICD CODE: NORMAL
HIV 1 & 2 AB SER-IMP: NORMAL
HPV I/H RISK 4 DNA CVX QL PROBE+SIG AMP: NEGATIVE
OTHER STN SPEC: NORMAL
PATH REPORT.FINAL DX SPEC: NORMAL
STAT OF ADQ CVX/VAG CYTO-IMP: NORMAL

## 2017-08-11 ENCOUNTER — OFFICE VISIT (OUTPATIENT)
Dept: OBSTETRICS AND GYNECOLOGY | Facility: CLINIC | Age: 47
End: 2017-08-11

## 2017-08-11 VITALS — WEIGHT: 130 LBS | HEIGHT: 59 IN | BODY MASS INDEX: 26.21 KG/M2

## 2017-08-11 DIAGNOSIS — Z09 POSTOP CHECK: Primary | ICD-10-CM

## 2017-08-11 PROCEDURE — 99213 OFFICE O/P EST LOW 20 MIN: CPT | Performed by: OBSTETRICS & GYNECOLOGY

## 2017-08-11 NOTE — PROGRESS NOTES
Subjective   Nallely Ochoa is a 47 y.o. female     History of Present Illness  CC: Pt here for post op. No c/o's.     Patient is approximately 3 weeks s/p hysteroscopy, polypectomy, D&C, and pelvic ultrasound.  Pathology showed uterine polyp and pathology has been reviewed with patient's mother.  Visit was done with patient's mother today due to patient having CP.  Patient's mother states that she is doing well since surgery.  She had 1 day of bleeding and then several days of spotting afterwards.  She has had no bleeding in several weeks.  She denies that the patient has had any pain or abnormal vaginal discharge.    The following portions of the patient's history were reviewed and updated as appropriate: allergies, current medications, past family history, past medical history, past social history, past surgical history and problem list.    Review of Systems   Genitourinary: Negative for menstrual problem, pelvic pain and vaginal discharge.   All other systems reviewed and are negative.      Objective   Physical Exam  Unable to perform exam    Assessment/Plan   Diagnoses and all orders for this visit:    Postop check      Per patient's mother, she is doing well postoperatively.  No concerns with bleeding.  Patient's mother was advised to call if her bleeding becomes irregular again, heavy, or she remains amenorrheic for several months.  Patient may follow-up on an as-needed basis.      Counseling was given to family for the following topics: diagnostic results, instructions for management and prognosis . Total time of the encounter was 15 minutes and 10 minutes was spend counseling.

## 2017-08-17 RX ORDER — PHENYTOIN 50 MG/1
TABLET, CHEWABLE ORAL
Qty: 90 TABLET | Refills: 0 | Status: SHIPPED | OUTPATIENT
Start: 2017-08-17 | End: 2017-11-14 | Stop reason: SDUPTHER

## 2017-08-29 ENCOUNTER — OFFICE VISIT (OUTPATIENT)
Dept: NEUROLOGY | Facility: CLINIC | Age: 47
End: 2017-08-29

## 2017-08-29 DIAGNOSIS — G80.9 CEREBRAL PALSY, UNSPECIFIED TYPE (HCC): ICD-10-CM

## 2017-08-29 DIAGNOSIS — G40.409 FAMILIAL TONIC CLONIC EPILEPSY (HCC): Primary | ICD-10-CM

## 2017-08-29 PROCEDURE — 99212 OFFICE O/P EST SF 10 MIN: CPT | Performed by: NURSE PRACTITIONER

## 2017-08-29 NOTE — PROGRESS NOTES
Subjective:     Patient ID: Nallely Ochoa is a 47 y.o. female with a history of cerebral palsy, epilepsy, MRDD.  She is accompanied today by her mother.  She has a history of generalized tonic-clonic seizures in childhood but she has been stable for years on her current medication regimen.  Her last seizure occurred years ago when a physician attempted to taper her off of the low dose of Dilantin.  She takes Dilantin 50 mg tubal tablet once daily as well as phenobarbital 32.4 mg tablet in addition to 97.2 mg tablet daily.  She has been doing well since her last visit.    History of Present Illness  The following portions of the patient's history were reviewed and updated as appropriate: allergies, current medications, past family history, past medical history, past social history, past surgical history and problem list.    Review of Systems     Objective:    The following exam was performed today and there has been no changes since her last visit.    Neurologic Exam  Mental Status: Alert. Nonverbal.    Cranial Nerves II-XII: Face is grossly symmetric. No nystagmus appreciated. Very limited exam due to pt's aggitation/noncompliance.  Motor: Normal bulk. No focal or lateralizing deficits grossly. She moves all extremities at will.    Gait: bilateral LE braces in place. She has altered gait consistent with her CP but is able to maneuver about the room at will.     Physical Exam  General: Well nourished, appropriately groomed, dysmorphic, and very aggitated. She will not allow vitals or hands on physical exam.  HEENT: Mucous membranes moist. Sclerae anicteric.    Skin: No notable rashes or lesions on the visible surfaces.      Assessment/Plan:     Nallely was seen today for seizures and cerebral palsy.    Diagnoses and all orders for this visit:    Familial tonic clonic epilepsy    Cerebral palsy, unspecified type        Epilepsy, full seizure control on her current regimen.  No changes made today.  Her mother  will notify us if there are any seizures or other significant medical changes.  Follow-up in 1 year.

## 2017-10-04 ENCOUNTER — CLINICAL SUPPORT (OUTPATIENT)
Dept: FAMILY MEDICINE CLINIC | Facility: CLINIC | Age: 47
End: 2017-10-04

## 2017-10-04 DIAGNOSIS — Z23 ENCOUNTER FOR IMMUNIZATION: Primary | ICD-10-CM

## 2017-10-04 PROCEDURE — 90686 IIV4 VACC NO PRSV 0.5 ML IM: CPT | Performed by: FAMILY MEDICINE

## 2017-10-04 PROCEDURE — G0008 ADMIN INFLUENZA VIRUS VAC: HCPCS | Performed by: FAMILY MEDICINE

## 2017-11-08 ENCOUNTER — TELEPHONE (OUTPATIENT)
Dept: FAMILY MEDICINE CLINIC | Facility: CLINIC | Age: 47
End: 2017-11-08

## 2017-11-08 NOTE — TELEPHONE ENCOUNTER
Analilia Ochoa called regarding her daughter Nallely. Analilia just received Dr. Tee's letter and is requesting a phone call from Dr. PALMER or Pippa on whom Dr. PALMER would recommend for Nallely. Someone whom would have the patience with Nallely.

## 2017-11-14 RX ORDER — PHENYTOIN 50 MG/1
TABLET, CHEWABLE ORAL
Qty: 90 TABLET | Refills: 0 | Status: SHIPPED | OUTPATIENT
Start: 2017-11-14 | End: 2018-02-07 | Stop reason: SDUPTHER

## 2017-12-20 RX ORDER — PHENOBARBITAL 32.4 MG/1
TABLET ORAL
Qty: 30 TABLET | Refills: 0 | OUTPATIENT
Start: 2017-12-20

## 2017-12-20 RX ORDER — PHENOBARBITAL 97.2 MG/1
TABLET ORAL
Qty: 30 TABLET | Refills: 0 | OUTPATIENT
Start: 2017-12-20

## 2018-01-09 ENCOUNTER — OFFICE VISIT (OUTPATIENT)
Dept: FAMILY MEDICINE CLINIC | Facility: CLINIC | Age: 48
End: 2018-01-09

## 2018-01-09 VITALS — HEART RATE: 78 BPM | HEIGHT: 59 IN | OXYGEN SATURATION: 95 %

## 2018-01-09 DIAGNOSIS — E03.9 ACQUIRED HYPOTHYROIDISM: Primary | ICD-10-CM

## 2018-01-09 DIAGNOSIS — E55.9 VITAMIN D DEFICIENCY: ICD-10-CM

## 2018-01-09 DIAGNOSIS — E66.3 OVERWEIGHT (BMI 25.0-29.9): ICD-10-CM

## 2018-01-09 DIAGNOSIS — G40.409 FAMILIAL TONIC CLONIC EPILEPSY (HCC): ICD-10-CM

## 2018-01-09 DIAGNOSIS — G80.9 CEREBRAL PALSY, UNSPECIFIED TYPE (HCC): ICD-10-CM

## 2018-01-09 DIAGNOSIS — N93.9 ABNORMAL UTERINE BLEEDING: ICD-10-CM

## 2018-01-09 DIAGNOSIS — Z13.220 SCREENING, LIPID: ICD-10-CM

## 2018-01-09 PROCEDURE — 99214 OFFICE O/P EST MOD 30 MIN: CPT | Performed by: FAMILY MEDICINE

## 2018-01-09 NOTE — PROGRESS NOTES
Subjective   Nallely Ochoa is a 47 y.o. female.     Chief Complaint   Patient presents with   • Hypothyroidism     follow up no complains   • Establish Care     new pt establishing today in office       HPI     Patient is a 47 year-old female here to establish care.  She has past medical history of cerebral palsy, hypothyroidism, epilepsy, and microcephaly.  She has significant anxiety in the medical environment, her mother states she has to be sedated in order for blood pressure to be checked.  She has had evaluation the last year at the dentist under sedation and had labs done at that time, she also had a Pap smear and was found to have a single cervical polyp which was removed and had subsequent hysteroscopy and D&C with her GYN resulted as benign.  She is having no current issues.    Review of her records, her last TSH was checked in January 2017.  She is due for another TSH, will get all labs at that time.     The following portions of the patient's history were reviewed and updated as appropriate: allergies, current medications, past family history, past medical history, past social history, past surgical history and problem list.    Review of Systems   Constitutional: Negative for fever and unexpected weight change.   HENT: Negative for dental problem.    Respiratory: Negative for shortness of breath.    Cardiovascular: Negative for chest pain.   Gastrointestinal: Negative for blood in stool.   Genitourinary: Negative for dysuria.   Skin: Negative for rash.   Allergic/Immunologic: Negative for environmental allergies.   Neurological: Negative for syncope.   Psychiatric/Behavioral: The patient is not nervous/anxious.        Objective  Vitals:    01/09/18 1012   Pulse: 78   SpO2: 95%        Physical Exam   Constitutional: She is oriented to person, place, and time. She appears well-developed and well-nourished. No distress.   Combative and anxious, unable to rest in the room.  She is consoled by her  mother.   HENT:   Head: Normocephalic.   Nose: Nose normal.   Eyes: EOM are normal.   Cardiovascular: Normal rate, regular rhythm, normal heart sounds and intact distal pulses.    Pulmonary/Chest: Effort normal and breath sounds normal. No respiratory distress.   Musculoskeletal: Normal range of motion.   5/5 strength in all 4 extremities.  Wearing ankle support braces.    Neurological: She is alert and oriented to person, place, and time.   Skin: Skin is warm and dry. No rash noted.   Psychiatric: She has a normal mood and affect. Her behavior is normal. Judgment and thought content normal.   Nursing note and vitals reviewed.        Current Outpatient Prescriptions:   •  DILANTIN INFATABS 50 MG chewable tablet, CHEW ONE TABLET BY MOUTH DAILY, Disp: 90 tablet, Rfl: 0  •  ibuprofen (ADVIL,MOTRIN) 100 MG/5ML suspension, Take 30 mL by mouth Every 6 (Six) Hours As Needed for Mild Pain ., Disp: 150 mL, Rfl: 1  •  levothyroxine (SYNTHROID, LEVOTHROID) 75 MCG tablet, Take 1 tablet by mouth Daily., Disp: 90 tablet, Rfl: 10  •  loratadine (CLARITIN) 10 MG tablet, Take 10 mg by mouth Every Night., Disp: , Rfl:   •  Multiple Vitamins-Minerals (MULTI-VITAMIN GUMMIES PO), Take 1 tablet by mouth Daily., Disp: , Rfl:   •  PHENobarbital (LUMINAL) 32.4 MG tablet, TAKE ONE TABLET BY MOUTH DAILY (Patient taking differently: TAKE ONE TABLET BY MOUTH AT NIGHT), Disp: 30 tablet, Rfl: 0  •  PHENobarbital (LUMINAL) 97.2 MG tablet, TAKE ONE TABLET BY MOUTH DAILY (Patient taking differently: TAKE ONE TABLET BY MOUTH AT NIGHT), Disp: 30 tablet, Rfl: 0    Procedures    Lab Results (most recent)     None          Assessment/Plan   Nallely was seen today for hypothyroidism and establish care.    Diagnoses and all orders for this visit:    Acquired hypothyroidism  -     TSH Rfx On Abnormal To Free T4; Future    Vitamin D deficiency  -     Vitamin D 25 Hydroxy; Future    Cerebral palsy, unspecified type    Familial tonic clonic epilepsy  -      Comprehensive Metabolic Panel; Future    Abnormal uterine bleeding       - Improving, GYN FU.  Will get labs as checking TSH.   -     CBC Auto Differential; Future  -     Vitamin B12 & Folate; Future  -     TSH Rfx On Abnormal To Free T4; Future    Screening, lipid  -     Lipid Panel; Future    Patient is a pleasant 47-year-old female with cervical palsy, she is in great distress at the physician's office.  Her mother states that she is calm at home.  The patient is unable to communicate.  She is combative with attempts to check her vitals, we are unable to get a blood pressure.  Her mother states she is only been able to get blood pressures when she is under sedation.  Her last TSH was January 2017, labs ordered for TSH and routine blood work.  Her mother states that in the past they have been able to get blood with restraining, the mother will bring some help to restrain her.  She states that Ativan typically does not help during the time of dry blood per causes her to be very sleepy after.  Otherwise, follow annually.  She will continue to follow-up with her neurologist in GYN.    Return in about 1 year (around 1/9/2019) for Annual physical.      Ariana Whitt MD

## 2018-01-14 ENCOUNTER — RESULTS ENCOUNTER (OUTPATIENT)
Dept: FAMILY MEDICINE CLINIC | Facility: CLINIC | Age: 48
End: 2018-01-14

## 2018-01-14 DIAGNOSIS — E66.3 OVERWEIGHT (BMI 25.0-29.9): ICD-10-CM

## 2018-01-14 DIAGNOSIS — E03.9 ACQUIRED HYPOTHYROIDISM: ICD-10-CM

## 2018-01-14 DIAGNOSIS — N93.9 ABNORMAL UTERINE BLEEDING: ICD-10-CM

## 2018-01-14 DIAGNOSIS — G40.409 FAMILIAL TONIC CLONIC EPILEPSY (HCC): ICD-10-CM

## 2018-01-14 DIAGNOSIS — Z13.220 SCREENING, LIPID: ICD-10-CM

## 2018-01-14 DIAGNOSIS — E55.9 VITAMIN D DEFICIENCY: ICD-10-CM

## 2018-01-23 RX ORDER — PHENOBARBITAL 97.2 MG/1
TABLET ORAL
Qty: 30 TABLET | Refills: 0 | Status: SHIPPED | OUTPATIENT
Start: 2018-01-23 | End: 2018-08-18 | Stop reason: SDUPTHER

## 2018-01-23 RX ORDER — PHENOBARBITAL 32.4 MG/1
TABLET ORAL
Qty: 30 TABLET | Refills: 0 | Status: SHIPPED | OUTPATIENT
Start: 2018-01-23 | End: 2018-08-18 | Stop reason: SDUPTHER

## 2018-02-08 RX ORDER — PHENYTOIN 50 MG/1
TABLET, CHEWABLE ORAL
Qty: 90 TABLET | Refills: 0 | Status: SHIPPED | OUTPATIENT
Start: 2018-02-08 | End: 2018-05-07 | Stop reason: SDUPTHER

## 2018-03-07 ENCOUNTER — TELEPHONE (OUTPATIENT)
Dept: FAMILY MEDICINE CLINIC | Facility: CLINIC | Age: 48
End: 2018-03-07

## 2018-03-07 NOTE — TELEPHONE ENCOUNTER
Called and verified with mother of patient, labs printed out, will come on Friday for lab work.  Tech aware to be quick prior to patient getting upset.

## 2018-03-09 LAB
ALBUMIN SERPL-MCNC: 4.7 G/DL (ref 3.5–5.2)
ALBUMIN/GLOB SERPL: 1.7 G/DL
ALP SERPL-CCNC: 59 U/L (ref 39–117)
ALT SERPL-CCNC: 13 U/L (ref 1–33)
AST SERPL-CCNC: 22 U/L (ref 1–32)
BILIRUB SERPL-MCNC: 0.3 MG/DL (ref 0.1–1.2)
BUN SERPL-MCNC: 12 MG/DL (ref 6–20)
BUN/CREAT SERPL: 14 (ref 7–25)
CALCIUM SERPL-MCNC: 9.2 MG/DL (ref 8.6–10.5)
CHLORIDE SERPL-SCNC: 100 MMOL/L (ref 98–107)
CHOLEST SERPL-MCNC: 137 MG/DL (ref 0–200)
CO2 SERPL-SCNC: 24.2 MMOL/L (ref 22–29)
CREAT SERPL-MCNC: 0.86 MG/DL (ref 0.57–1)
GFR SERPLBLD CREATININE-BSD FMLA CKD-EPI: 70 ML/MIN/1.73
GFR SERPLBLD CREATININE-BSD FMLA CKD-EPI: 85 ML/MIN/1.73
GLOBULIN SER CALC-MCNC: 2.8 GM/DL
GLUCOSE SERPL-MCNC: 101 MG/DL (ref 65–99)
HDLC SERPL-MCNC: 58 MG/DL (ref 40–60)
LDLC SERPL CALC-MCNC: 69 MG/DL (ref 0–100)
POTASSIUM SERPL-SCNC: 4.3 MMOL/L (ref 3.5–5.2)
PROT SERPL-MCNC: 7.5 G/DL (ref 6–8.5)
SODIUM SERPL-SCNC: 141 MMOL/L (ref 136–145)
TRIGL SERPL-MCNC: 50 MG/DL (ref 0–150)
TSH SERPL DL<=0.005 MIU/L-ACNC: 3.54 MIU/ML (ref 0.27–4.2)
VLDLC SERPL CALC-MCNC: 10 MG/DL (ref 5–40)

## 2018-05-08 RX ORDER — PHENYTOIN 50 MG/1
TABLET, CHEWABLE ORAL
Qty: 90 TABLET | Refills: 0 | Status: SHIPPED | OUTPATIENT
Start: 2018-05-08 | End: 2018-07-28 | Stop reason: SDUPTHER

## 2018-05-17 ENCOUNTER — TELEPHONE (OUTPATIENT)
Dept: FAMILY MEDICINE CLINIC | Facility: CLINIC | Age: 48
End: 2018-05-17

## 2018-05-17 RX ORDER — LEVOTHYROXINE SODIUM 0.07 MG/1
75 TABLET ORAL DAILY
Qty: 90 TABLET | Refills: 10 | Status: SHIPPED | OUTPATIENT
Start: 2018-05-17 | End: 2019-04-12 | Stop reason: SDUPTHER

## 2018-05-17 NOTE — TELEPHONE ENCOUNTER
Pt mother calling, she is needing a new script from Dr. Whitt for levothyroxine 75mg. (Edmundo Choudhary)

## 2018-07-30 RX ORDER — PHENYTOIN 50 MG/1
TABLET, CHEWABLE ORAL
Qty: 90 TABLET | Refills: 3 | Status: SHIPPED | OUTPATIENT
Start: 2018-07-30 | End: 2018-08-30 | Stop reason: SDUPTHER

## 2018-08-20 RX ORDER — PHENOBARBITAL 32.4 MG/1
TABLET ORAL
Qty: 30 TABLET | Refills: 4 | Status: SHIPPED | OUTPATIENT
Start: 2018-08-20 | End: 2018-08-30 | Stop reason: SDUPTHER

## 2018-08-20 RX ORDER — PHENOBARBITAL 97.2 MG/1
TABLET ORAL
Qty: 30 TABLET | Refills: 4 | Status: SHIPPED | OUTPATIENT
Start: 2018-08-20 | End: 2018-08-30 | Stop reason: SDUPTHER

## 2018-08-30 ENCOUNTER — OFFICE VISIT (OUTPATIENT)
Dept: NEUROLOGY | Facility: CLINIC | Age: 48
End: 2018-08-30

## 2018-08-30 DIAGNOSIS — G40.409 FAMILIAL TONIC CLONIC EPILEPSY (HCC): Primary | ICD-10-CM

## 2018-08-30 DIAGNOSIS — G80.9 CEREBRAL PALSY, UNSPECIFIED TYPE (HCC): ICD-10-CM

## 2018-08-30 PROCEDURE — 99213 OFFICE O/P EST LOW 20 MIN: CPT | Performed by: NURSE PRACTITIONER

## 2018-08-30 RX ORDER — PHENYTOIN 50 MG/1
50 TABLET, CHEWABLE ORAL DAILY
Qty: 90 TABLET | Refills: 3 | Status: SHIPPED | OUTPATIENT
Start: 2018-08-30 | End: 2019-09-03 | Stop reason: SDUPTHER

## 2018-08-30 RX ORDER — PHENOBARBITAL 97.2 MG/1
97.2 TABLET ORAL DAILY
Qty: 30 TABLET | Refills: 0 | Status: SHIPPED | OUTPATIENT
Start: 2018-08-30 | End: 2019-02-13 | Stop reason: SDUPTHER

## 2018-08-30 RX ORDER — PHENOBARBITAL 32.4 MG/1
32.4 TABLET ORAL DAILY
Qty: 30 TABLET | Refills: 0 | Status: SHIPPED | OUTPATIENT
Start: 2018-08-30 | End: 2019-02-13 | Stop reason: SDUPTHER

## 2018-08-30 NOTE — PROGRESS NOTES
Subjective:     Patient ID: Nallely Ochoa is a 48 y.o. female with a history of cerebral palsy, epilepsy, MRDD.  She is accompanied today by her mother.  She has a history of generalized tonic-clonic seizures in childhood but she has been stable for years on her current medication regimen.  Her last seizure occurred years ago when a physician attempted to taper her off of the low dose of Dilantin.  She takes Dilantin 50 mg once daily as well as phenobarbital 32.4 mg tablet in addition to 97.2 mg tablet daily.  She has been doing well since her last visit.    Seizures        The following portions of the patient's history were reviewed and updated as appropriate: allergies, current medications, past family history, past medical history, past social history, past surgical history and problem list.    Review of Systems   Neurological: Positive for seizures.        Objective:     The following exam was performed today and there has been no changes since her last visit.    Neurologic Exam  Mental Status: Alert. Nonverbal.    Cranial Nerves II-XII: Face is grossly symmetric. No nystagmus appreciated. Very limited exam due to pt's aggitation/noncompliance.  Motor: Normal bulk. No focal or lateralizing deficits grossly. She moves all extremities at will.    Gait: bilateral LE braces in place. She has altered gait consistent with her CP but is able to maneuver about the room at will.     Physical Exam   General: Well nourished, appropriately groomed, dysmorphic, and very aggitated. She will not allow vitals or hands on physical exam.  HEENT: Mucous membranes moist. Sclerae anicteric.    Skin: No notable rashes or lesions on the visible surfaces.      Assessment/Plan:     Nallely was seen today for seizures.    Diagnoses and all orders for this visit:    Familial tonic clonic epilepsy (CMS/HCC)    Cerebral palsy, unspecified type (CMS/HCC)        Epilepsy, full seizure control on her current regimen.  No changes made  today.  Her mother will notify us if there are any seizures or other significant medical changes.  Follow-up in 1 year.

## 2018-09-11 ENCOUNTER — OFFICE VISIT (OUTPATIENT)
Dept: FAMILY MEDICINE CLINIC | Facility: CLINIC | Age: 48
End: 2018-09-11

## 2018-09-11 ENCOUNTER — TELEPHONE (OUTPATIENT)
Dept: FAMILY MEDICINE CLINIC | Facility: CLINIC | Age: 48
End: 2018-09-11

## 2018-09-11 VITALS — HEIGHT: 59 IN

## 2018-09-11 DIAGNOSIS — R35.0 URINARY FREQUENCY: Primary | ICD-10-CM

## 2018-09-11 DIAGNOSIS — N92.6 MISSED PERIOD: ICD-10-CM

## 2018-09-11 DIAGNOSIS — N30.00 ACUTE CYSTITIS WITHOUT HEMATURIA: ICD-10-CM

## 2018-09-11 LAB
BILIRUB BLD-MCNC: NEGATIVE MG/DL
CLARITY, POC: CLEAR
COLOR UR: YELLOW
GLUCOSE UR STRIP-MCNC: NEGATIVE MG/DL
KETONES UR QL: NEGATIVE
LEUKOCYTE EST, POC: NEGATIVE
NITRITE UR-MCNC: NEGATIVE MG/ML
PH UR: 6.5 [PH] (ref 5–8)
PROT UR STRIP-MCNC: NEGATIVE MG/DL
RBC # UR STRIP: NEGATIVE /UL
SP GR UR: 1.02 (ref 1–1.03)
UROBILINOGEN UR QL: NORMAL

## 2018-09-11 PROCEDURE — 81003 URINALYSIS AUTO W/O SCOPE: CPT | Performed by: FAMILY MEDICINE

## 2018-09-11 PROCEDURE — 99213 OFFICE O/P EST LOW 20 MIN: CPT | Performed by: FAMILY MEDICINE

## 2018-09-11 RX ORDER — NITROFURANTOIN 25; 75 MG/1; MG/1
100 CAPSULE ORAL 2 TIMES DAILY
Qty: 14 CAPSULE | Refills: 0 | Status: SHIPPED | OUTPATIENT
Start: 2018-09-11 | End: 2018-09-11

## 2018-09-11 NOTE — TELEPHONE ENCOUNTER
I called the pharmacy, and then called mom to to discuss.  OK to open capsules and sprinkle on food.  Best to not give a med that will lower seizure threshold.

## 2018-09-11 NOTE — PROGRESS NOTES
Nallely Ochoa is a 48 y.o. female.     Chief Complaint   Patient presents with   • Urinary Frequency     for about a week   • Amenorrhea     pt is late for periods and mom think can be early state menopause       HPI     Pt is a pleasant 48 y.o. YO female here for Increasing urinary frequency and missing her last period.  PMH includes hypothyroidism well-controlled, cerebral palsy well-controlled, epilepsy well-controlled, cataracts, and behavioral and global developmental delay.    Patient is a pleasant 48-year-old female here for increasing urinary frequency.  Her mom has noticed over the past 5-7 days that she is needing to urinate every hour.  She is having issues with incontinence which is a new problem.  She has had no fevers or other systemic symptoms.  At home she does not have behavioral issues and is calm.  She is extremely anxious at the doctor's office.  At the last time she was here she had blood drawn.  She is currently combative and is unable to be restrained with more than 3 people.  She has been afebrile.    The following portions of the patient's history were reviewed and updated as appropriate: allergies, current medications, past family history, past medical history, past social history, past surgical history and problem list.    Review of Systems   Constitutional: Negative for fatigue and fever.   Respiratory: Negative for shortness of breath.    Genitourinary: Positive for frequency and menstrual problem. Negative for dysuria.       Objective  There were no vitals filed for this visit.     Physical Exam   Constitutional: She is oriented to person, place, and time. She appears well-developed and well-nourished. No distress.   Able to obtain vitals as patient will not tolerate blood pressure cuff or pulse monitor.   HENT:   Head: Normocephalic.   Nose: Nose normal.   Eyes: EOM are normal. No scleral icterus.   Pulmonary/Chest: Effort normal. No respiratory distress.   Musculoskeletal:  Normal range of motion.   Light palpation of the back with no tenderness    Neurological: She is alert and oriented to person, place, and time.   Skin: Skin is warm and dry. No rash noted.   Psychiatric:   Patient is extremely upset, thrashing arms and legs yelling and screaming.  She is is aggressively hitting those near her.   Nursing note and vitals reviewed.        Current Outpatient Prescriptions:   •  ibuprofen (ADVIL,MOTRIN) 100 MG/5ML suspension, Take 30 mL by mouth Every 6 (Six) Hours As Needed for Mild Pain ., Disp: 150 mL, Rfl: 1  •  levothyroxine (SYNTHROID, LEVOTHROID) 75 MCG tablet, Take 1 tablet by mouth Daily., Disp: 90 tablet, Rfl: 10  •  loratadine (CLARITIN) 10 MG tablet, Take 10 mg by mouth Every Night., Disp: , Rfl:   •  Multiple Vitamins-Minerals (MULTI-VITAMIN GUMMIES PO), Take 1 tablet by mouth Daily., Disp: , Rfl:   •  PHENobarbital (LUMINAL) 32.4 MG tablet, Take 1 tablet by mouth Daily., Disp: 30 tablet, Rfl: 0  •  PHENobarbital (LUMINAL) 97.2 MG tablet, Take 1 tablet by mouth Daily., Disp: 30 tablet, Rfl: 0  •  phenytoin (DILANTIN) 50 MG chewable tablet, Chew 1 tablet Daily., Disp: 90 tablet, Rfl: 3  •  nitrofurantoin, macrocrystal-monohydrate, (MACROBID) 100 MG capsule, Take 1 capsule by mouth 2 (Two) Times a Day for 7 days., Disp: 14 capsule, Rfl: 0    Procedures    Lab Results (most recent)     Procedure Component Value Units Date/Time    POC Urinalysis Dipstick, Automated [[id number]] Collected:  09/11/18 1330    Specimen:  Urine Updated:  09/11/18 1331     Color Yellow     Clarity, UA Clear     Specific Gravity  1.025     pH, Urine 6.5     Leukocytes Negative     Nitrite, UA Negative     Protein, POC Negative mg/dL      Glucose, UA Negative mg/dL      Ketones, UA Negative     Urobilinogen, UA Normal     Bilirubin Negative     Blood, UA Negative              Nallely was seen today for urinary frequency and amenorrhea.    Diagnoses and all orders for this visit:    Urinary frequency  -      POC Urinalysis Dipstick, Automated  -     nitrofurantoin, macrocrystal-monohydrate, (MACROBID) 100 MG capsule; Take 1 capsule by mouth 2 (Two) Times a Day for 7 days.    Acute cystitis without hematuria  -     nitrofurantoin, macrocrystal-monohydrate, (MACROBID) 100 MG capsule; Take 1 capsule by mouth 2 (Two) Times a Day for 7 days.  -     Urine Culture - Urine, Urine, Clean Catch    Missed period      Patient here for increased urinary frequency, I do suspect that she could have a urinary tract infection.  Her urine is negative currently.  Will treat for UTI and obtain culture.  If this is negative or does not improve her symptoms - prescribe a trial of oxybutynin for urge incontinence.  In terms of missing her last menstrual cycle, she is likely entering menopause which is at a similar age to her mom.  She is not sexually active.    Unable to obtain vitals with patient is aggressive/ combative/ nonverbal and unable to restrain with the staff we have currently.  Visibly normal.  Allowed me to touch her back with no tenderness.     Return if symptoms worsen or fail to improve.      Ariana Whitt MD

## 2018-09-11 NOTE — TELEPHONE ENCOUNTER
Patient's mother is calling regarding the medication you call in for her today. She was told it was going to be tablets but when she got the RX she realized it as Capsules. She is wondering if you can call her in tablets instead because she doesn't think she will be able to give the patient capsules as she might chew on it.    No

## 2018-09-13 LAB
BACTERIA UR CULT: NORMAL
BACTERIA UR CULT: NORMAL

## 2018-09-17 ENCOUNTER — TELEPHONE (OUTPATIENT)
Dept: FAMILY MEDICINE CLINIC | Facility: CLINIC | Age: 48
End: 2018-09-17

## 2018-09-18 ENCOUNTER — TELEPHONE (OUTPATIENT)
Dept: FAMILY MEDICINE CLINIC | Facility: CLINIC | Age: 48
End: 2018-09-18

## 2018-09-20 RX ORDER — OXYBUTYNIN CHLORIDE 10 MG/1
10 TABLET, EXTENDED RELEASE ORAL DAILY
Qty: 30 TABLET | Refills: 2 | Status: SHIPPED | OUTPATIENT
Start: 2018-09-20 | End: 2018-10-09 | Stop reason: SDUPTHER

## 2018-09-20 NOTE — TELEPHONE ENCOUNTER
Pt m0m is ok with the medication ,she also wanted to let you know tiny broke a tooth and she will have and extraction surgery and she may need medical clearance she will call me back and let us know what day it will be and  What does she need to get done before surgery

## 2018-09-20 NOTE — TELEPHONE ENCOUNTER
Oxybutynin prescribed for urge incontinence.  If 1 tablet a day does not work she may increase to 2 tablets daily.  If this seems to improve her symptoms is okay to continue this.  If not improving then I would want to see her to evaluate further.  Thanks

## 2018-09-20 NOTE — TELEPHONE ENCOUNTER
Okay, that usually does not require preoperative clearance.  If she does need that done she can see me in the office.

## 2018-09-24 ENCOUNTER — TELEPHONE (OUTPATIENT)
Dept: FAMILY MEDICINE CLINIC | Facility: CLINIC | Age: 48
End: 2018-09-24

## 2018-10-01 ENCOUNTER — OFFICE VISIT (OUTPATIENT)
Dept: FAMILY MEDICINE CLINIC | Facility: CLINIC | Age: 48
End: 2018-10-01

## 2018-10-01 VITALS — RESPIRATION RATE: 16 BRPM | HEIGHT: 59 IN

## 2018-10-01 DIAGNOSIS — R73.9 HYPERGLYCEMIA: ICD-10-CM

## 2018-10-01 DIAGNOSIS — E78.5 HYPERLIPIDEMIA, UNSPECIFIED HYPERLIPIDEMIA TYPE: ICD-10-CM

## 2018-10-01 DIAGNOSIS — E03.9 ACQUIRED HYPOTHYROIDISM: Primary | ICD-10-CM

## 2018-10-01 DIAGNOSIS — E55.9 VITAMIN D DEFICIENCY: ICD-10-CM

## 2018-10-01 DIAGNOSIS — G40.409 FAMILIAL TONIC CLONIC EPILEPSY (HCC): ICD-10-CM

## 2018-10-01 DIAGNOSIS — R35.89 POLYURIA: ICD-10-CM

## 2018-10-01 DIAGNOSIS — N93.9 ABNORMAL UTERINE BLEEDING: ICD-10-CM

## 2018-10-01 PROCEDURE — 99213 OFFICE O/P EST LOW 20 MIN: CPT | Performed by: FAMILY MEDICINE

## 2018-10-01 NOTE — PROGRESS NOTES
Nallely Ochoa is a 48 y.o. female.     Chief Complaint   Patient presents with   • Surgical Clearance     pt having dental procedure in a week        HPI     Pt is a pleasant 48 y.o. YO female here for surgical clearance.  PMH includes hypothyroidism well-controlled, seizures well-controlled on the same medications for 7 years, abnormal uterine bleeding that is stable, vitamin D deficiency as well controlled, microcephaly, she will palsy with developmental delay.    Now having some irregular menses - having a dental procedure soon, needs surgical clearance.  She is having no signs of chest pain or palpitations over the patient is not verbal.  She is extremely difficult to assess as she is very fearful of doctors.  She needs anesthesia in order to get in her dental cleaning.  Plan to get labs and vitals at that time.      The following portions of the patient's history were reviewed and updated as appropriate: allergies, current medications, past family history, past medical history, past social history, past surgical history and problem list.    Review of Systems   Constitutional: Negative for fatigue and fever.   Respiratory: Negative for shortness of breath.    Psychiatric/Behavioral: Positive for agitation, behavioral problems and confusion. The patient is nervous/anxious and is hyperactive.    All other systems reviewed and are negative.      Objective  Vitals:    10/01/18 0806   Resp: 16        Physical Exam   Constitutional: She is oriented to person, place, and time. She appears well-developed and well-nourished. No distress.   The patient is extremely agitated, combative hitting and kicking.   HENT:   Head: Normocephalic.   Nose: Nose normal.   Eyes: EOM are normal.   Cardiovascular: Regular rhythm, normal heart sounds and intact distal pulses.    No murmur heard.  Tachycardic    Pulmonary/Chest: Effort normal and breath sounds normal. No respiratory distress.   Musculoskeletal: Normal range of  motion.   Neurological: She is alert and oriented to person, place, and time.   Skin: Skin is warm and dry. No rash noted.   Psychiatric: She has a normal mood and affect. Her behavior is normal. Judgment and thought content normal.   Nursing note and vitals reviewed.        Current Outpatient Prescriptions:   •  ibuprofen (ADVIL,MOTRIN) 100 MG/5ML suspension, Take 30 mL by mouth Every 6 (Six) Hours As Needed for Mild Pain ., Disp: 150 mL, Rfl: 1  •  levothyroxine (SYNTHROID, LEVOTHROID) 75 MCG tablet, Take 1 tablet by mouth Daily., Disp: 90 tablet, Rfl: 10  •  loratadine (CLARITIN) 10 MG tablet, Take 10 mg by mouth Every Night., Disp: , Rfl:   •  Multiple Vitamins-Minerals (MULTI-VITAMIN GUMMIES PO), Take 1 tablet by mouth Daily., Disp: , Rfl:   •  oxybutynin XL (DITROPAN XL) 10 MG 24 hr tablet, Take 1 tablet by mouth Daily., Disp: 30 tablet, Rfl: 2  •  PHENobarbital (LUMINAL) 32.4 MG tablet, Take 1 tablet by mouth Daily., Disp: 30 tablet, Rfl: 0  •  PHENobarbital (LUMINAL) 97.2 MG tablet, Take 1 tablet by mouth Daily., Disp: 30 tablet, Rfl: 0  •  phenytoin (DILANTIN) 50 MG chewable tablet, Chew 1 tablet Daily., Disp: 90 tablet, Rfl: 3    Procedures    Lab Results (most recent)     None              Nallely was seen today for surgical clearance.    Diagnoses and all orders for this visit:    Acquired hypothyroidism  -     TSH Rfx On Abnormal To Free T4    Vitamin D deficiency  -     Vitamin D 25 Hydroxy    Familial tonic clonic epilepsy (CMS/HCC)  -     Comprehensive Metabolic Panel  -     Phenytoin level, total    Abnormal uterine bleeding  -     CBC Auto Differential    Polyuria  -     Hemoglobin A1c    Hyperlipidemia, unspecified hyperlipidemia type  -     Lipid Panel    Hyperglycemia  -     Hemoglobin A1c    Patient is cleared for surgery for dental procedure.  She has a low risk for cardiac events, and medically needs a dental cleaning for preventative health.  Will get labs that is it has been 6 months  since her last check.  Ordered as above.  Unable to get vitals despite 3 attempts.  Mother will record vitals while she is under anesthesia and obtain labs at that time.      Return if symptoms worsen or fail to improve.      Ariana Whitt MD

## 2018-10-02 ENCOUNTER — TELEPHONE (OUTPATIENT)
Dept: FAMILY MEDICINE CLINIC | Facility: CLINIC | Age: 48
End: 2018-10-02

## 2018-10-02 NOTE — TELEPHONE ENCOUNTER
Spoke with pt's Mother. Dentist office requesting a physician letter stating that Nallely is cleared for surgery and can undergo anesthesia. Her dentist appt is 10/5.     Fax number [phone]   Attention: Dia

## 2018-10-09 RX ORDER — OXYBUTYNIN CHLORIDE 10 MG/1
10 TABLET, EXTENDED RELEASE ORAL 2 TIMES DAILY
Qty: 60 TABLET | Refills: 2 | Status: SHIPPED | OUTPATIENT
Start: 2018-10-09 | End: 2018-10-10

## 2018-10-10 ENCOUNTER — TELEPHONE (OUTPATIENT)
Dept: FAMILY MEDICINE CLINIC | Facility: CLINIC | Age: 48
End: 2018-10-10

## 2018-10-10 RX ORDER — OXYBUTYNIN CHLORIDE 5 MG/5ML
5 SYRUP ORAL 4 TIMES DAILY
Qty: 473 ML | Refills: 11 | Status: SHIPPED | OUTPATIENT
Start: 2018-10-10 | End: 2018-10-11

## 2018-10-10 NOTE — TELEPHONE ENCOUNTER
Pt mom want to dr richards check blood test she had done at [facility] let her know if anything is wrong

## 2018-10-10 NOTE — TELEPHONE ENCOUNTER
Sent oxybutynin to the pharmacy and a syrup form with the 5 mg dose.  If this is not approved, I will change it back to the 5 mg tablets.

## 2018-10-10 NOTE — TELEPHONE ENCOUNTER
We are waiting for the insurance prior authorization on pt's oxybutynin 10 mg medication. Pt will be out of medication tomorrow. Pharmacy told pt's mother, if Dr. Whitt writes a prescription for the oxybutynin for 5 mg four times a day insurance will cover.     Mother also mentioned that she cannot get pt to shallow pill whole. She has tried numerous times to disguise and out in food and pt still chews pill.

## 2018-10-11 RX ORDER — OXYBUTYNIN CHLORIDE 5 MG/1
10 TABLET ORAL 2 TIMES DAILY
Qty: 360 TABLET | Refills: 3 | Status: SHIPPED | OUTPATIENT
Start: 2018-10-11 | End: 2019-04-12

## 2018-10-11 NOTE — TELEPHONE ENCOUNTER
Spoke with Analilia. Analilia wants to thank you for sending in a liquid form, even though it would be easier to administer, it would be difficult having to pack daily to Nallely's adult program, and signing release forms, and there would be no way she would get her fourth dose. Analiila was able to get Nallely to take her medication last night with ice cream. Analilia is wondering if Nallely can be prescribed the 5 mg tablets take two tablets in the morning and two at night?

## 2018-10-17 ENCOUNTER — TELEPHONE (OUTPATIENT)
Dept: FAMILY MEDICINE CLINIC | Facility: CLINIC | Age: 48
End: 2018-10-17

## 2018-10-17 NOTE — TELEPHONE ENCOUNTER
Mom is calling for lab results saying it has been over a week. No lab work found recently for this office.    Please advise.

## 2018-10-17 NOTE — TELEPHONE ENCOUNTER
I was able to review the labs at [facility] - (they were not routed to me)     Their complete metabolic panel shows normal kidney and liver function.  Their diabetes screen is negative.  Their cholesterol within normal limits.  Vitamin D  Looks normal - I do not see the TSH resulted - it was included in my orders.   We can repeat them next year.    Viviane - do you mind calling [facility] to see if they can add the TSH to the blood (if it is still available in the lab, the order is already given, but we can fax another if they need.)    Thank you,    Ariana Whitt M.D.

## 2018-10-29 ENCOUNTER — FLU SHOT (OUTPATIENT)
Dept: FAMILY MEDICINE CLINIC | Facility: CLINIC | Age: 48
End: 2018-10-29

## 2018-10-29 DIAGNOSIS — Z23 NEED FOR IMMUNIZATION AGAINST INFLUENZA: Primary | ICD-10-CM

## 2018-10-29 PROCEDURE — G0008 ADMIN INFLUENZA VIRUS VAC: HCPCS | Performed by: FAMILY MEDICINE

## 2018-10-29 PROCEDURE — 90674 CCIIV4 VAC NO PRSV 0.5 ML IM: CPT | Performed by: FAMILY MEDICINE

## 2019-02-14 RX ORDER — PHENOBARBITAL 97.2 MG/1
TABLET ORAL
Qty: 30 TABLET | Refills: 0 | Status: SHIPPED | OUTPATIENT
Start: 2019-02-14 | End: 2019-03-15 | Stop reason: SDUPTHER

## 2019-02-14 RX ORDER — PHENOBARBITAL 32.4 MG/1
TABLET ORAL
Qty: 30 TABLET | Refills: 0 | Status: SHIPPED | OUTPATIENT
Start: 2019-02-14 | End: 2019-03-15 | Stop reason: SDUPTHER

## 2019-03-19 RX ORDER — PHENOBARBITAL 97.2 MG/1
TABLET ORAL
Qty: 30 TABLET | Refills: 0 | Status: SHIPPED | OUTPATIENT
Start: 2019-03-19 | End: 2019-04-11 | Stop reason: SDUPTHER

## 2019-03-19 RX ORDER — PHENOBARBITAL 32.4 MG/1
TABLET ORAL
Qty: 30 TABLET | Refills: 0 | Status: SHIPPED | OUTPATIENT
Start: 2019-03-19 | End: 2019-04-11 | Stop reason: SDUPTHER

## 2019-04-12 ENCOUNTER — OFFICE VISIT (OUTPATIENT)
Dept: FAMILY MEDICINE CLINIC | Facility: CLINIC | Age: 49
End: 2019-04-12

## 2019-04-12 ENCOUNTER — TELEPHONE (OUTPATIENT)
Dept: FAMILY MEDICINE CLINIC | Facility: CLINIC | Age: 49
End: 2019-04-12

## 2019-04-12 VITALS — HEIGHT: 59 IN | RESPIRATION RATE: 14 BRPM | BODY MASS INDEX: 26.26 KG/M2

## 2019-04-12 DIAGNOSIS — Z78.0 POST-MENOPAUSE: ICD-10-CM

## 2019-04-12 DIAGNOSIS — R35.0 URINARY FREQUENCY: ICD-10-CM

## 2019-04-12 DIAGNOSIS — R35.0 URINARY FREQUENCY: Primary | ICD-10-CM

## 2019-04-12 DIAGNOSIS — E03.9 ACQUIRED HYPOTHYROIDISM: ICD-10-CM

## 2019-04-12 DIAGNOSIS — R56.9 SEIZURE (HCC): ICD-10-CM

## 2019-04-12 PROBLEM — N93.9 ABNORMAL UTERINE BLEEDING: Status: RESOLVED | Noted: 2017-07-18 | Resolved: 2019-04-12

## 2019-04-12 PROCEDURE — 99214 OFFICE O/P EST MOD 30 MIN: CPT | Performed by: FAMILY MEDICINE

## 2019-04-12 RX ORDER — OXYBUTYNIN CHLORIDE 5 MG/1
5 TABLET ORAL 2 TIMES DAILY
Qty: 360 TABLET | Refills: 3 | Status: SHIPPED | OUTPATIENT
Start: 2019-04-12 | End: 2019-04-12 | Stop reason: SDUPTHER

## 2019-04-12 RX ORDER — OXYBUTYNIN CHLORIDE 5 MG/1
5 TABLET ORAL 3 TIMES DAILY
Qty: 270 TABLET | Refills: 3 | Status: SHIPPED | OUTPATIENT
Start: 2019-04-12 | End: 2019-04-26 | Stop reason: SDUPTHER

## 2019-04-12 RX ORDER — LEVOTHYROXINE SODIUM 0.07 MG/1
75 TABLET ORAL DAILY
Qty: 90 TABLET | Refills: 10 | Status: SHIPPED | OUTPATIENT
Start: 2019-04-12 | End: 2020-04-23

## 2019-04-12 NOTE — TELEPHONE ENCOUNTER
Analilia wanted to confirm with Dr. Whitt that Nallely is taking 10 mg of Oxybutynin in the morning and 10 mg in the afternoon. Even though the original prescription in Nallely's chart states 5 mg. Analilia agrees with  and would like to decrease the Oxybutynin to 10 mg in the morning and 5 mg in the afternoon. May a new prescription be sent to Nallely's pharmacy?
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
## 2019-04-15 RX ORDER — PHENOBARBITAL 97.2 MG/1
97.2 TABLET ORAL DAILY
Qty: 30 TABLET | Refills: 5 | Status: SHIPPED | OUTPATIENT
Start: 2019-04-15 | End: 2019-08-02 | Stop reason: SDUPTHER

## 2019-04-15 RX ORDER — PHENOBARBITAL 32.4 MG/1
32.4 TABLET ORAL DAILY
Qty: 30 TABLET | Refills: 5 | Status: SHIPPED | OUTPATIENT
Start: 2019-04-15 | End: 2019-08-02 | Stop reason: SDUPTHER

## 2019-04-15 NOTE — TELEPHONE ENCOUNTER
Dr Ibarra can you please fill this Rx for Frankie Rodriguez. She will be out of the office this week.     Thanks

## 2019-04-25 ENCOUNTER — TELEPHONE (OUTPATIENT)
Dept: FAMILY MEDICINE CLINIC | Facility: CLINIC | Age: 49
End: 2019-04-25

## 2019-04-25 NOTE — TELEPHONE ENCOUNTER
Per pharmacy, recent Rx sent for Oxybutynin 5mg TID is confusing to the mother. Pharmacy is asking if you would consider prescribing two separate Rx for Oxybutynin 10mg QD in the AM and Oxybutynin 5mg QD in the PM.    Please advise.

## 2019-04-26 DIAGNOSIS — R35.0 URINARY FREQUENCY: ICD-10-CM

## 2019-04-26 RX ORDER — OXYBUTYNIN CHLORIDE 5 MG/1
5 TABLET ORAL DAILY
Qty: 30 TABLET | Refills: 3 | Status: SHIPPED | OUTPATIENT
Start: 2019-04-26 | End: 2019-10-08 | Stop reason: SDUPTHER

## 2019-04-26 RX ORDER — OXYBUTYNIN CHLORIDE 10 MG/1
10 TABLET, EXTENDED RELEASE ORAL DAILY
Qty: 30 TABLET | Refills: 0 | Status: SHIPPED | OUTPATIENT
Start: 2019-04-26 | End: 2019-10-08 | Stop reason: SDUPTHER

## 2019-04-26 NOTE — TELEPHONE ENCOUNTER
I do not have initially doing this, however often insurance will not approve 2 different prescriptions of the same medication.  I am glad to authorize 5 mg in the morning and 10 mg at night if insurance will approve it.  Viviane can you please call in this medication?

## 2019-08-02 DIAGNOSIS — G40.409 FAMILIAL TONIC CLONIC EPILEPSY (HCC): Primary | ICD-10-CM

## 2019-08-02 NOTE — TELEPHONE ENCOUNTER
Patient is going on vacation and going to run out of medication, OK to fill early per Dr Ibarra.    Needs refill on phenobarbital 97.2 mg - 1 tablet po qd  Phenobarbital 32.4 mg - 1 tab po qd

## 2019-08-05 RX ORDER — PHENOBARBITAL 97.2 MG/1
97.2 TABLET ORAL DAILY
Qty: 30 TABLET | Refills: 5 | Status: SHIPPED | OUTPATIENT
Start: 2019-08-05 | End: 2020-02-14 | Stop reason: SDUPTHER

## 2019-08-05 RX ORDER — PHENOBARBITAL 32.4 MG/1
32.4 TABLET ORAL DAILY
Qty: 30 TABLET | Refills: 5 | Status: SHIPPED | OUTPATIENT
Start: 2019-08-05 | End: 2020-02-14 | Stop reason: SDUPTHER

## 2019-09-03 ENCOUNTER — OFFICE VISIT (OUTPATIENT)
Dept: NEUROLOGY | Facility: CLINIC | Age: 49
End: 2019-09-03

## 2019-09-03 DIAGNOSIS — G40.409 FAMILIAL TONIC CLONIC EPILEPSY (HCC): ICD-10-CM

## 2019-09-03 DIAGNOSIS — G80.9 CEREBRAL PALSY, UNSPECIFIED TYPE (HCC): Primary | ICD-10-CM

## 2019-09-03 PROCEDURE — 99212 OFFICE O/P EST SF 10 MIN: CPT | Performed by: NURSE PRACTITIONER

## 2019-09-03 RX ORDER — PHENYTOIN 50 MG/1
50 TABLET, CHEWABLE ORAL DAILY
Qty: 90 TABLET | Refills: 3 | Status: SHIPPED | OUTPATIENT
Start: 2019-09-03 | End: 2020-10-22

## 2019-09-03 NOTE — PROGRESS NOTES
Subjective:     Patient ID: Nallely Ochoa is a 49 y.o. female with a history of cerebral palsy, epilepsy, MRDD.  She is accompanied today by her mother.  She has a history of generalized tonic-clonic seizures in childhood but she has been stable for years on her current medication regimen.  Her last seizure occurred years ago when a physician attempted to taper her off of the low dose of Dilantin.  She takes Dilantin 50 mg once daily as well as phenobarbital 32.4 mg tablet in addition to 97.2 mg tablet daily.  She has been doing well since her last visit.       History of Present Illness  The following portions of the patient's history were reviewed and updated as appropriate: allergies, current medications, past family history, past medical history, past social history, past surgical history and problem list.    Review of Systems   Constitutional: Positive for activity change (patient is in arya menopause, has missed a couple periods).   Neurological: Negative for dizziness, tremors, seizures (no seizures since last seen), syncope, facial asymmetry, speech difficulty, weakness, light-headedness, numbness and headaches.   Hematological: Does not bruise/bleed easily.   Psychiatric/Behavioral: Positive for behavioral problems. Negative for agitation, confusion, decreased concentration, dysphoric mood, hallucinations, self-injury, sleep disturbance and suicidal ideas. The patient is not nervous/anxious and is not hyperactive.         Objective:    Neurologic Exam  Mental Status: Alert. Nonverbal.    Cranial Nerves II-XII: Face is grossly symmetric. No nystagmus appreciated. Very limited exam due to pt's aggitation/noncompliance.  Motor: Normal bulk. No focal or lateralizing deficits grossly. She moves all extremities at will.    Gait: bilateral LE braces in place. She has altered gait consistent with her CP but is able to maneuver about the room at will.        Physical Exam  General: Well nourished,  appropriately groomed, dysmorphic, and very aggitated. She will not allow vitals or hands on physical exam.  HEENT: Mucous membranes moist. Sclerae anicteric.    Skin: No notable rashes or lesions on the visible surfaces.       Assessment/Plan:     Nallely was seen today for seizures.    Diagnoses and all orders for this visit:    Cerebral palsy, unspecified type (CMS/HCC)    Familial tonic clonic epilepsy (CMS/HCC)          Epilepsy, full seizure control on her current regimen.  No changes made today.  Her mother will notify us if there are any seizures or other significant medical changes.  Follow-up in 1 year.

## 2019-10-08 ENCOUNTER — TELEPHONE (OUTPATIENT)
Dept: FAMILY MEDICINE CLINIC | Facility: CLINIC | Age: 49
End: 2019-10-08

## 2019-10-08 DIAGNOSIS — R35.0 URINARY FREQUENCY: ICD-10-CM

## 2019-10-08 RX ORDER — OXYBUTYNIN CHLORIDE 10 MG/1
10 TABLET, EXTENDED RELEASE ORAL DAILY
Qty: 90 TABLET | Refills: 1 | Status: SHIPPED | OUTPATIENT
Start: 2019-10-08 | End: 2020-07-21

## 2019-10-08 RX ORDER — OXYBUTYNIN CHLORIDE 5 MG/1
5 TABLET ORAL NIGHTLY
Qty: 90 TABLET | Refills: 1 | Status: SHIPPED | OUTPATIENT
Start: 2019-10-08 | End: 2020-05-11

## 2019-10-08 RX ORDER — OXYBUTYNIN CHLORIDE 5 MG/1
5 TABLET ORAL 2 TIMES DAILY
Qty: 60 TABLET | Refills: 1 | Status: SHIPPED | OUTPATIENT
Start: 2019-10-08 | End: 2019-10-08 | Stop reason: SDUPTHER

## 2019-10-08 RX ORDER — OXYBUTYNIN CHLORIDE 10 MG/1
10 TABLET, EXTENDED RELEASE ORAL 2 TIMES DAILY
Qty: 60 TABLET | Refills: 1 | Status: SHIPPED | OUTPATIENT
Start: 2019-10-08 | End: 2019-10-08 | Stop reason: SDUPTHER

## 2019-10-08 NOTE — TELEPHONE ENCOUNTER
Nallely's oxybutynin both 5mg and 10mg are due for refills soon, [facility] has incorrect directions for both oxybutynin prescriptions, taking both doses twice daily, instead of once in the morning and lower dose in the afternoon. Analilia(mother) is asking if we can send over refills for both with our updated directions?

## 2019-10-29 ENCOUNTER — CLINICAL SUPPORT (OUTPATIENT)
Dept: FAMILY MEDICINE CLINIC | Facility: CLINIC | Age: 49
End: 2019-10-29

## 2019-10-29 DIAGNOSIS — Z23 NEED FOR IMMUNIZATION AGAINST INFLUENZA: Primary | ICD-10-CM

## 2019-10-29 PROCEDURE — 90674 CCIIV4 VAC NO PRSV 0.5 ML IM: CPT | Performed by: FAMILY MEDICINE

## 2019-10-29 PROCEDURE — G0008 ADMIN INFLUENZA VIRUS VAC: HCPCS | Performed by: FAMILY MEDICINE

## 2019-12-04 ENCOUNTER — TELEPHONE (OUTPATIENT)
Dept: FAMILY MEDICINE CLINIC | Facility: CLINIC | Age: 49
End: 2019-12-04

## 2019-12-04 NOTE — TELEPHONE ENCOUNTER
Pt has dentist apt next week and mom is requesting medication to calm her down last time she had apt she was very bad and very agitaded  Pt uses [facility]

## 2019-12-06 RX ORDER — DIAZEPAM 2 MG/1
2 TABLET ORAL 2 TIMES DAILY PRN
Qty: 2 TABLET | Refills: 0 | Status: SHIPPED | OUTPATIENT
Start: 2019-12-06 | End: 2020-05-11

## 2019-12-06 NOTE — TELEPHONE ENCOUNTER
Patient's mother was calling again regarding this. She is requesting a tranquilizer for a one time thing

## 2019-12-06 NOTE — TELEPHONE ENCOUNTER
I would not be able to prescribe a tranquilizer but I could use a low-dose Valium to calm her down before the office visit.  I would advise that she take it 30 minutes prior to the point.

## 2020-02-14 DIAGNOSIS — G40.109 SEIZURE, TEMPORAL LOBE (HCC): Primary | ICD-10-CM

## 2020-02-14 RX ORDER — PHENOBARBITAL 97.2 MG/1
97.2 TABLET ORAL DAILY
Qty: 90 TABLET | Refills: 1 | Status: SHIPPED | OUTPATIENT
Start: 2020-02-14 | End: 2020-08-06

## 2020-02-14 RX ORDER — PHENOBARBITAL 32.4 MG/1
32.4 TABLET ORAL DAILY
Qty: 90 TABLET | Refills: 1 | Status: SHIPPED | OUTPATIENT
Start: 2020-02-14 | End: 2020-08-06

## 2020-02-14 NOTE — TELEPHONE ENCOUNTER
Pt's mother called to get refill on a medication: PHENobarbital (LUMINAL) 32.4 MG tablet and PHENobarbital (LUMINAL) 97.2 MG tablet.     Dr Ibarra would fill this Rx for Frankie Rodriguez so that the patient could get refills. Frankie Rodriguez can only do a 30 day supply.  Are you okay filling this for Frankie Rodriguez?

## 2020-02-25 ENCOUNTER — TELEPHONE (OUTPATIENT)
Dept: FAMILY MEDICINE CLINIC | Facility: CLINIC | Age: 50
End: 2020-02-25

## 2020-02-25 NOTE — TELEPHONE ENCOUNTER
PT HAD STOMACH BUG THE LAST  WEEK AND THE MOTHER STOPPED HER OXYBUTIN BECAUSE OF THE VOMITING AND SEEMS SHE IS DOING WELL WITHOUT IT, SHE IS NOT HAVING ANY ACCIDENTS SO FAR AND IS BEING 5 DAYS ,IS THAT OK TO DISCONTINUED IF THE PT IS FINE ?

## 2020-02-26 NOTE — TELEPHONE ENCOUNTER
Yes, please let her know that it is okay to discontinue this medication.  If her symptoms resume then it may be okay to use rarely as needed.

## 2020-04-23 DIAGNOSIS — E03.9 ACQUIRED HYPOTHYROIDISM: ICD-10-CM

## 2020-04-23 RX ORDER — LEVOTHYROXINE SODIUM 0.07 MG/1
TABLET ORAL
Qty: 90 TABLET | Refills: 9 | Status: SHIPPED | OUTPATIENT
Start: 2020-04-23 | End: 2021-07-22

## 2020-05-05 ENCOUNTER — TELEPHONE (OUTPATIENT)
Dept: FAMILY MEDICINE CLINIC | Facility: CLINIC | Age: 50
End: 2020-05-05

## 2020-05-05 NOTE — TELEPHONE ENCOUNTER
Pt mother would like to still talk to Dr. Whitt even though she knows the physical cannot be done over video. Appt booked as video visit.

## 2020-05-05 NOTE — TELEPHONE ENCOUNTER
Mom called in wanting to know if the patient can have her Physical as a video visit?  She states the patient will not wear a mask    Please call back to advise @ [phone]

## 2020-05-11 ENCOUNTER — TELEMEDICINE (OUTPATIENT)
Dept: FAMILY MEDICINE CLINIC | Facility: CLINIC | Age: 50
End: 2020-05-11

## 2020-05-11 VITALS — BODY MASS INDEX: 27.27 KG/M2 | WEIGHT: 135 LBS

## 2020-05-11 DIAGNOSIS — Z11.59 ENCOUNTER FOR HEPATITIS C SCREENING TEST FOR LOW RISK PATIENT: ICD-10-CM

## 2020-05-11 DIAGNOSIS — N95.1 PERIMENOPAUSAL: Primary | ICD-10-CM

## 2020-05-11 DIAGNOSIS — E03.9 ACQUIRED HYPOTHYROIDISM: ICD-10-CM

## 2020-05-11 DIAGNOSIS — E78.5 HYPERLIPIDEMIA, UNSPECIFIED HYPERLIPIDEMIA TYPE: ICD-10-CM

## 2020-05-11 DIAGNOSIS — F43.0 ACUTE STRESS REACTION: ICD-10-CM

## 2020-05-11 DIAGNOSIS — E55.9 VITAMIN D DEFICIENCY: ICD-10-CM

## 2020-05-11 PROBLEM — H50.00 MONOCULAR ESOTROPIA: Status: ACTIVE | Noted: 2019-09-18

## 2020-05-11 PROBLEM — R46.89 COMBATIVE BEHAVIOR: Status: ACTIVE | Noted: 2019-09-18

## 2020-05-11 PROBLEM — R47.01 DOES NOT SPEAK: Status: ACTIVE | Noted: 2019-09-18

## 2020-05-11 PROBLEM — H02.9 DISORDER OF EYELID: Status: ACTIVE | Noted: 2020-02-07

## 2020-05-11 PROCEDURE — 99214 OFFICE O/P EST MOD 30 MIN: CPT | Performed by: FAMILY MEDICINE

## 2020-05-11 NOTE — PROGRESS NOTES
Nallely Ochoa is a 50 y.o. female.     Chief Complaint   Patient presents with   • Urinary Frequency     patient is having physical    • Menstrual Problem   • Hypothyroidism       HPI     Pt is a pleasant 50 y.o. YO female here for Urinary Incontinence, some irregular menstrual cycles.      Both patient and her mom were ill in February, URI symptoms, and diarrhea and nausea.     Hypothyroidism well controlled    Urinary incontinence, some increased frequency.  No odor, or change to the cloudiness,  Mild improvement with urinary incontinence.  She does like to go to the bathroom to get attention.  Feeling well, no nausea or fever.      Menstrual cycle:   11/25/2018 - 4/2019  Spotting May, June, July  January 30  Feb 19  March 10   April 22    The following portions of the patient's history were reiviewed and updated as appropriate: allergies, current medications, past family history, past medical history, past social history, past surgical history and problem list.    Review of Systems   Constitutional: Negative.    HENT: Negative.    Eyes: Negative.    Respiratory: Negative.    Cardiovascular: Negative.    Gastrointestinal: Negative.    Endocrine: Negative.    Genitourinary: Negative.    Musculoskeletal: Negative.    Skin: Negative.    Allergic/Immunologic: Negative.    Neurological: Negative.    Hematological: Negative.    Psychiatric/Behavioral: Positive for behavioral problems and dysphoric mood. Negative for agitation and confusion. The patient is nervous/anxious.    All other systems reviewed and are negative.      Objective  There were no vitals filed for this visit.     Physical Exam   Constitutional: She appears well-developed and well-nourished. No distress.   Pulmonary/Chest: No respiratory distress.   Psychiatric: She has a normal mood and affect. Her behavior is normal. Judgment and thought content normal.         Current Outpatient Medications:   •  ibuprofen (ADVIL,MOTRIN) 100 MG/5ML  suspension, Take 30 mL by mouth Every 6 (Six) Hours As Needed for Mild Pain ., Disp: 150 mL, Rfl: 1  •  levothyroxine (SYNTHROID, LEVOTHROID) 75 MCG tablet, TAKE ONE TABLET BY MOUTH DAILY, Disp: 90 tablet, Rfl: 9  •  loratadine (CLARITIN) 10 MG tablet, Take 10 mg by mouth Every Night., Disp: , Rfl:   •  Multiple Vitamins-Minerals (MULTI-VITAMIN GUMMIES PO), Take 1 tablet by mouth Daily., Disp: , Rfl:   •  oxybutynin XL (DITROPAN-XL) 10 MG 24 hr tablet, Take 1 tablet by mouth Daily., Disp: 90 tablet, Rfl: 1  •  PHENobarbital (LUMINAL) 32.4 MG tablet, Take 1 tablet by mouth Daily., Disp: 90 tablet, Rfl: 1  •  PHENobarbital (LUMINAL) 97.2 MG tablet, Take 1 tablet by mouth Daily., Disp: 90 tablet, Rfl: 1  •  phenytoin (DILANTIN) 50 MG chewable tablet, Chew 1 tablet Daily., Disp: 90 tablet, Rfl: 3    Procedures    Lab Results (most recent)     None              Nallely was seen today for urinary frequency, menstrual problem and hypothyroidism.    Diagnoses and all orders for this visit:    Perimenopausal  -     Comprehensive Metabolic Panel  -     CBC & Differential    Acquired hypothyroidism  -     T4  -     TSH    Vitamin D deficiency  -     Vitamin D 25 Hydroxy    Hyperlipidemia, unspecified hyperlipidemia type  -     Lipid Panel    Encounter for hepatitis C screening test for low risk patient  -     Hepatitis C Antibody    Acute stress reaction   -     CBC & Differential      Hypothyroidism is well controlled, labs ordered.    Increased urinary frequency, no odor, only in the AM - seems to be related to attention with urge incontinence.  I don't think it looks like a UTI.     Decreased menstrual frequency - Had a hysteroscopy a couple years ago.  No evidence of cancer at that time.      Pt with cognitive and physical debilities.  Apt with pt and her mother.      Unable to obtain vitals unless patient sedated.      Return if symptoms worsen or fail to improve.      Ariana Whitt MD    This was an audio and video  enabled telemedicine encounter secondary to CDC recommendations and patient safety with COVID19 Pandemic.   Time of visit: 30 min

## 2020-07-21 RX ORDER — OXYBUTYNIN CHLORIDE 10 MG/1
TABLET, EXTENDED RELEASE ORAL
Qty: 90 TABLET | Refills: 0 | Status: SHIPPED | OUTPATIENT
Start: 2020-07-21 | End: 2020-10-21 | Stop reason: SDUPTHER

## 2020-08-06 DIAGNOSIS — G40.109 SEIZURE, TEMPORAL LOBE (HCC): ICD-10-CM

## 2020-08-06 RX ORDER — PHENOBARBITAL 97.2 MG/1
TABLET ORAL
Qty: 90 TABLET | Refills: 0 | Status: SHIPPED | OUTPATIENT
Start: 2020-08-06 | End: 2020-11-09 | Stop reason: SDUPTHER

## 2020-08-06 RX ORDER — PHENOBARBITAL 32.4 MG/1
TABLET ORAL
Qty: 90 TABLET | Refills: 0 | Status: SHIPPED | OUTPATIENT
Start: 2020-08-06 | End: 2020-11-09 | Stop reason: SDUPTHER

## 2020-09-11 ENCOUNTER — OFFICE VISIT (OUTPATIENT)
Dept: NEUROLOGY | Facility: CLINIC | Age: 50
End: 2020-09-11

## 2020-09-11 DIAGNOSIS — G80.9 CEREBRAL PALSY, UNSPECIFIED TYPE (HCC): ICD-10-CM

## 2020-09-11 DIAGNOSIS — G40.409 FAMILIAL TONIC CLONIC EPILEPSY (HCC): Primary | ICD-10-CM

## 2020-09-11 PROCEDURE — 99442 PR PHYS/QHP TELEPHONE EVALUATION 11-20 MIN: CPT | Performed by: NURSE PRACTITIONER

## 2020-09-11 NOTE — PROGRESS NOTES
Subjective   Nallely Ochoa is a 50 y.o. female  with a history of cerebral palsy, epilepsy, MRDD.    She is being seen today via telephone visit due to the Covid-19 pandemic. Her mother consented to this type of visit and all questions were answered prior to starting. Total time of telephone encounter was 20 minutes.    I spoke to her mother on the phone today. She has a history of generalized tonic-clonic seizures in childhood but she has been stable for years on her current medication regimen.  Her last seizure occurred years ago when a physician attempted to taper her off of the low dose of Dilantin.  She takes Dilantin 50 mg once daily as well as phenobarbital 32.4 mg tablet in addition to 97.2 mg tablet daily.  She has been doing well since her last visit.    History of Present Illness     The following portions of the patient's history were reviewed and updated as appropriate: allergies, current medications, past family history, past medical history, past social history, past surgical history and problem list.    Review of Systems   Constitutional: Negative for activity change, appetite change, unexpected weight gain and unexpected weight loss.   HENT: Negative for facial swelling, trouble swallowing and voice change.    Eyes: Negative for photophobia, pain and visual disturbance.   Respiratory: Negative for chest tightness, shortness of breath and wheezing.    Cardiovascular: Negative for chest pain, palpitations and leg swelling.   Gastrointestinal: Negative for abdominal pain, nausea and vomiting.   Endocrine: Negative for cold intolerance and heat intolerance.   Musculoskeletal: Negative for arthralgias, back pain, gait problem, joint swelling, myalgias, neck pain, neck stiffness and bursitis.   Neurological: Negative for dizziness, tremors, seizures, syncope, facial asymmetry, speech difficulty, weakness, light-headedness, numbness, headache, memory problem and confusion.   Hematological: Does not  bruise/bleed easily.   Psychiatric/Behavioral: Negative for agitation, behavioral problems, decreased concentration, dysphoric mood, hallucinations, self-injury, sleep disturbance, suicidal ideas, negative for hyperactivity, depressed mood and stress. The patient is nervous/anxious.        Objective   Physical Exam  Not performed due to limitations of telephone visit.    Assessment/Plan      Nallely was seen today for seizures.    Diagnoses and all orders for this visit:    Familial tonic clonic epilepsy (CMS/HCC)    Cerebral palsy, unspecified type (CMS/HCC)      Epilepsy, full seizure control on her current regimen.  No changes made today.  Her mother will notify us if there are any seizures or other significant medical changes.  Follow-up in 1 year.

## 2020-10-13 ENCOUNTER — FLU SHOT (OUTPATIENT)
Dept: FAMILY MEDICINE CLINIC | Facility: CLINIC | Age: 50
End: 2020-10-13

## 2020-10-13 DIAGNOSIS — Z23 NEED FOR IMMUNIZATION AGAINST INFLUENZA: Primary | ICD-10-CM

## 2020-10-13 PROCEDURE — 90686 IIV4 VACC NO PRSV 0.5 ML IM: CPT | Performed by: FAMILY MEDICINE

## 2020-10-13 PROCEDURE — 90471 IMMUNIZATION ADMIN: CPT | Performed by: FAMILY MEDICINE

## 2020-10-21 RX ORDER — OXYBUTYNIN CHLORIDE 10 MG/1
10 TABLET, EXTENDED RELEASE ORAL DAILY
Qty: 90 TABLET | Refills: 2 | Status: SHIPPED | OUTPATIENT
Start: 2020-10-21 | End: 2021-07-16

## 2020-10-22 RX ORDER — PHENYTOIN 50 MG/1
TABLET, CHEWABLE ORAL
Qty: 90 TABLET | Refills: 2 | Status: SHIPPED | OUTPATIENT
Start: 2020-10-22 | End: 2021-07-23 | Stop reason: SDUPTHER

## 2020-11-09 DIAGNOSIS — G40.109 SEIZURE, TEMPORAL LOBE (HCC): ICD-10-CM

## 2020-11-10 RX ORDER — PHENOBARBITAL 97.2 MG/1
97.2 TABLET ORAL DAILY
Qty: 30 TABLET | Refills: 0 | Status: SHIPPED | OUTPATIENT
Start: 2020-11-10 | End: 2020-12-04

## 2020-11-10 RX ORDER — PHENOBARBITAL 32.4 MG/1
32.4 TABLET ORAL DAILY
Qty: 30 TABLET | Refills: 0 | Status: SHIPPED | OUTPATIENT
Start: 2020-11-10 | End: 2020-12-04

## 2020-12-04 DIAGNOSIS — G40.109 SEIZURE, TEMPORAL LOBE (HCC): ICD-10-CM

## 2020-12-04 RX ORDER — PHENOBARBITAL 32.4 MG/1
TABLET ORAL
Qty: 30 TABLET | Refills: 0 | Status: SHIPPED | OUTPATIENT
Start: 2020-12-04 | End: 2021-01-05

## 2020-12-04 RX ORDER — PHENOBARBITAL 97.2 MG/1
TABLET ORAL
Qty: 30 TABLET | Refills: 0 | Status: SHIPPED | OUTPATIENT
Start: 2020-12-04 | End: 2021-01-05

## 2021-01-05 DIAGNOSIS — G40.109 SEIZURE, TEMPORAL LOBE (HCC): ICD-10-CM

## 2021-01-05 RX ORDER — PHENOBARBITAL 32.4 MG/1
TABLET ORAL
Qty: 30 TABLET | Refills: 0 | Status: SHIPPED | OUTPATIENT
Start: 2021-01-05 | End: 2021-02-04

## 2021-01-05 RX ORDER — PHENOBARBITAL 97.2 MG/1
TABLET ORAL
Qty: 30 TABLET | Refills: 0 | Status: SHIPPED | OUTPATIENT
Start: 2021-01-05 | End: 2021-02-04

## 2021-02-03 DIAGNOSIS — G40.109 SEIZURE, TEMPORAL LOBE (HCC): ICD-10-CM

## 2021-02-04 RX ORDER — PHENOBARBITAL 97.2 MG/1
TABLET ORAL
Qty: 30 TABLET | Refills: 0 | Status: SHIPPED | OUTPATIENT
Start: 2021-02-04 | End: 2021-03-09

## 2021-02-04 RX ORDER — PHENOBARBITAL 32.4 MG/1
TABLET ORAL
Qty: 30 TABLET | Refills: 0 | Status: SHIPPED | OUTPATIENT
Start: 2021-02-04 | End: 2021-03-09

## 2021-03-08 DIAGNOSIS — G40.109 SEIZURE, TEMPORAL LOBE (HCC): ICD-10-CM

## 2021-03-09 RX ORDER — PHENOBARBITAL 32.4 MG/1
TABLET ORAL
Qty: 30 TABLET | Refills: 0 | Status: SHIPPED | OUTPATIENT
Start: 2021-03-09 | End: 2021-04-08

## 2021-03-09 RX ORDER — PHENOBARBITAL 97.2 MG/1
TABLET ORAL
Qty: 30 TABLET | Refills: 0 | Status: SHIPPED | OUTPATIENT
Start: 2021-03-09 | End: 2021-04-08

## 2021-03-29 ENCOUNTER — TELEPHONE (OUTPATIENT)
Dept: FAMILY MEDICINE CLINIC | Facility: CLINIC | Age: 51
End: 2021-03-29
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
## 2021-03-30 NOTE — TELEPHONE ENCOUNTER
It is possible that more fiber could make her more constipated if she is dehydrated.  I would recommend increasing her water intake and possibly taking MiraLAX.  The MiraLAX can help bring water into the stools and make it a little softer.  If this is not working after a few days please advise to give us another call.

## 2021-04-07 DIAGNOSIS — G40.109 SEIZURE, TEMPORAL LOBE (HCC): ICD-10-CM

## 2021-04-08 RX ORDER — PHENOBARBITAL 32.4 MG/1
TABLET ORAL
Qty: 30 TABLET | Refills: 0 | Status: SHIPPED | OUTPATIENT
Start: 2021-04-08 | End: 2021-05-05

## 2021-04-08 RX ORDER — PHENOBARBITAL 97.2 MG/1
TABLET ORAL
Qty: 30 TABLET | Refills: 0 | Status: SHIPPED | OUTPATIENT
Start: 2021-04-08 | End: 2021-05-05

## 2021-05-05 DIAGNOSIS — G40.109 SEIZURE, TEMPORAL LOBE (HCC): ICD-10-CM

## 2021-05-05 RX ORDER — PHENOBARBITAL 32.4 MG/1
TABLET ORAL
Qty: 30 TABLET | Refills: 0 | Status: SHIPPED | OUTPATIENT
Start: 2021-05-05 | End: 2021-06-08

## 2021-05-05 RX ORDER — PHENOBARBITAL 97.2 MG/1
TABLET ORAL
Qty: 30 TABLET | Refills: 0 | Status: SHIPPED | OUTPATIENT
Start: 2021-05-05 | End: 2021-06-08

## 2021-05-20 ENCOUNTER — TELEMEDICINE (OUTPATIENT)
Dept: FAMILY MEDICINE CLINIC | Facility: CLINIC | Age: 51
End: 2021-05-20

## 2021-05-20 VITALS — WEIGHT: 135 LBS | BODY MASS INDEX: 27.27 KG/M2

## 2021-05-20 DIAGNOSIS — Z11.59 ENCOUNTER FOR HEPATITIS C SCREENING TEST FOR LOW RISK PATIENT: ICD-10-CM

## 2021-05-20 DIAGNOSIS — E03.9 ACQUIRED HYPOTHYROIDISM: ICD-10-CM

## 2021-05-20 DIAGNOSIS — E55.9 VITAMIN D DEFICIENCY: ICD-10-CM

## 2021-05-20 DIAGNOSIS — R56.9 SEIZURE (HCC): ICD-10-CM

## 2021-05-20 DIAGNOSIS — E78.5 HYPERLIPIDEMIA, UNSPECIFIED HYPERLIPIDEMIA TYPE: ICD-10-CM

## 2021-05-20 DIAGNOSIS — K59.04 CHRONIC IDIOPATHIC CONSTIPATION: ICD-10-CM

## 2021-05-20 DIAGNOSIS — Z12.11 SCREEN FOR COLON CANCER: ICD-10-CM

## 2021-05-20 DIAGNOSIS — Z00.00 MEDICARE ANNUAL WELLNESS VISIT, SUBSEQUENT: Primary | ICD-10-CM

## 2021-05-20 PROCEDURE — G0439 PPPS, SUBSEQ VISIT: HCPCS | Performed by: FAMILY MEDICINE

## 2021-05-20 NOTE — PROGRESS NOTES
Nallely Ochoa is a 51 y.o. female.     Chief Complaint   Patient presents with   • Annual Exam     no complains       HPI     Pt is a pleasant 51 y.o. YO female here for ***.        The following portions of the patient's history were reviewed and updated as appropriate: allergies, current medications, past family history, past medical history, past social history, past surgical history and problem list.    Review of Systems   Genitourinary: Positive for menstrual problem.        Think it can be menopause        Objective  There were no vitals filed for this visit.     Physical Exam      Current Outpatient Medications:   •  levothyroxine (SYNTHROID, LEVOTHROID) 75 MCG tablet, TAKE ONE TABLET BY MOUTH DAILY, Disp: 90 tablet, Rfl: 9  •  loratadine (CLARITIN) 10 MG tablet, Take 10 mg by mouth Every Night., Disp: , Rfl:   •  Multiple Vitamins-Minerals (MULTI-VITAMIN GUMMIES PO), Take 1 tablet by mouth Daily., Disp: , Rfl:   •  oxybutynin XL (DITROPAN-XL) 10 MG 24 hr tablet, Take 1 tablet by mouth Daily., Disp: 90 tablet, Rfl: 2  •  PHENobarbital (LUMINAL) 32.4 MG tablet, TAKE ONE TABLET BY MOUTH DAILY, Disp: 30 tablet, Rfl: 0  •  PHENobarbital (LUMINAL) 97.2 MG tablet, TAKE ONE TABLET BY MOUTH DAILY, Disp: 30 tablet, Rfl: 0  •  phenytoin (DILANTIN) 50 MG chewable tablet, CHEW ONE TABLET BY MOUTH DAILY, Disp: 90 tablet, Rfl: 2  •  ibuprofen (ADVIL,MOTRIN) 100 MG/5ML suspension, Take 30 mL by mouth Every 6 (Six) Hours As Needed for Mild Pain ., Disp: 150 mL, Rfl: 1    Procedures    Lab Results (most recent)     None              There are no diagnoses linked to this encounter.      No follow-ups on file.      Viviane Garcia MA    This was an audio and video enabled telemedicine encounter secondary to CDC recommendations and patient safety with COVID19 Pandemic.

## 2021-05-20 NOTE — PROGRESS NOTES
The ABCs of the Annual Wellness Visit  Subsequent Medicare Wellness Visit    Chief Complaint   Patient presents with   • Medicare Wellness-subsequent     no complains       Subjective   History of Present Illness:  Nallely Ochoa is a 51 y.o. female who presents for a Subsequent Medicare Wellness Visit.  Menopause - LMP was 4/2020    HEALTH RISK ASSESSMENT    Recent Hospitalizations:  No hospitalization(s) within the last year.    Current Medical Providers:  Patient Care Team:  Ariana Whitt MD as PCP - General (Family Medicine)    Smoking Status:  Social History     Tobacco Use   Smoking Status Never Smoker   Smokeless Tobacco Never Used       Alcohol Consumption:  Social History     Substance and Sexual Activity   Alcohol Use No       Depression Screen:   PHQ-2/PHQ-9 Depression Screening 5/20/2021   Little interest or pleasure in doing things 0   Feeling down, depressed, or hopeless 0   Trouble falling or staying asleep, or sleeping too much 0   Feeling tired or having little energy 0   Poor appetite or overeating 0   Feeling bad about yourself - or that you are a failure or have let yourself or your family down 0   Trouble concentrating on things, such as reading the newspaper or watching television 0   Moving or speaking so slowly that other people could have noticed. Or the opposite - being so fidgety or restless that you have been moving around a lot more than usual 0   Thoughts that you would be better off dead, or of hurting yourself in some way 0   Total Score 0       Fall Risk Screen:  STEADI Fall Risk Assessment has not been completed.    Health Habits and Functional and Cognitive Screening:  Functional & Cognitive Status 5/20/2021   Do you have difficulty preparing food and eating? Yes   Do you have difficulty bathing yourself, getting dressed or grooming yourself? Yes   Do you have difficulty using the toilet? Yes   Do you have difficulty moving around from place to place? Yes   Do you have  trouble with steps or getting out of a bed or a chair? Yes   Current Diet Well Balanced Diet   Dental Exam Unknown   Eye Exam Not up to date   Exercise (times per week) 2 times per week   Current Exercises Include Walking   Do you need help using the phone?  Yes   Are you deaf or do you have serious difficulty hearing?  Yes   Do you need help with transportation? Yes   Do you need help shopping? Yes   Do you need help preparing meals?  Yes   Do you need help with housework?  Yes   Do you need help with laundry? Yes   Do you need help taking your medications? Yes   Do you need help managing money? Yes   Have you felt unusual stress, anger or loneliness in the last month? Yes   Who do you live with? Other   If you need help, do you have trouble finding someone available to you? No   Have you been bothered in the last four weeks by sexual problems? No   Do you have difficulty concentrating, remembering or making decisions? Yes         Does the patient have evidence of cognitive impairment? Yes, cognitive delay stable     Asprin use counseling:Does not need ASA (and currently is not on it)    Age-appropriate Screening Schedule:  Refer to the list below for future screening recommendations based on patient's age, sex and/or medical conditions. Orders for these recommended tests are listed in the plan section. The patient has been provided with a written plan.    Health Maintenance   Topic Date Due   • TDAP/TD VACCINES (1 - Tdap) Never done   • ZOSTER VACCINE (1 of 2) Never done   • LIPID PANEL  10/05/2019   • INFLUENZA VACCINE  08/01/2021   • PAP SMEAR  09/01/2022          The following portions of the patient's history were reviewed and updated as appropriate: allergies, current medications, past family history, past medical history, past social history, past surgical history and problem list.    Outpatient Medications Prior to Visit   Medication Sig Dispense Refill   • levothyroxine (SYNTHROID, LEVOTHROID) 75 MCG  tablet TAKE ONE TABLET BY MOUTH DAILY 90 tablet 9   • loratadine (CLARITIN) 10 MG tablet Take 10 mg by mouth Every Night.     • Multiple Vitamins-Minerals (MULTI-VITAMIN GUMMIES PO) Take 1 tablet by mouth Daily.     • oxybutynin XL (DITROPAN-XL) 10 MG 24 hr tablet Take 1 tablet by mouth Daily. 90 tablet 2   • PHENobarbital (LUMINAL) 32.4 MG tablet TAKE ONE TABLET BY MOUTH DAILY 30 tablet 0   • PHENobarbital (LUMINAL) 97.2 MG tablet TAKE ONE TABLET BY MOUTH DAILY 30 tablet 0   • phenytoin (DILANTIN) 50 MG chewable tablet CHEW ONE TABLET BY MOUTH DAILY 90 tablet 2   • ibuprofen (ADVIL,MOTRIN) 100 MG/5ML suspension Take 30 mL by mouth Every 6 (Six) Hours As Needed for Mild Pain . 150 mL 1     No facility-administered medications prior to visit.       Patient Active Problem List   Diagnosis   • Cerebral palsy (CMS/HCC)   • Seizure (CMS/HCC)   • Hypothyroidism   • Familial tonic clonic epilepsy (CMS/HCC)   • Vitamin D deficiency   • Drug therapy   • Cataract   • Microcephalic (CMS/HCC)   • Perimenopausal   • Combative behavior   • Disorder of eyelid   • Does not speak   • Monocular esotropia   • Cerebral palsy (CMS/HCC)       Advanced Care Planning:  ACP discussion was held with the patient during this visit. Patient does not have an advance directive, information provided.    Review of Systems   Constitutional: Negative for chills, fatigue, fever and unexpected weight change.   HENT: Negative for ear pain, hearing loss, sinus pressure, sore throat and tinnitus.    Eyes: Negative for pain, discharge and redness.   Respiratory: Negative for cough, shortness of breath and wheezing.    Cardiovascular: Negative for chest pain, palpitations and leg swelling.   Gastrointestinal: Negative for abdominal pain, constipation, diarrhea and nausea.   Endocrine: Negative for cold intolerance and heat intolerance.   Genitourinary: Negative for difficulty urinating, flank pain and urgency.   Musculoskeletal: Negative for back pain,  joint swelling and myalgias.   Skin: Negative for rash and wound.   Allergic/Immunologic: Negative for environmental allergies and food allergies.   Neurological: Negative for dizziness, seizures, numbness and headaches.   Hematological: Negative for adenopathy. Does not bruise/bleed easily.   Psychiatric/Behavioral: Negative for decreased concentration, dysphoric mood and sleep disturbance. The patient is not nervous/anxious.    All other systems reviewed and are negative.      Compared to one year ago, the patient feels her physical health is the same.  Compared to one year ago, the patient feels her mental health is the same.    Reviewed chart for potential of high risk medication in the elderly: yes  Reviewed chart for potential of harmful drug interactions in the elderly:yes    Objective         Vitals:    05/20/21 0801   Weight: 61.2 kg (135 lb)   PainSc: 0-No pain       Body mass index is 27.27 kg/m².  Discussed the patient's BMI with her. The BMI is in the acceptable range.    Physical Exam  Constitutional:       General: She is not in acute distress.     Appearance: She is well-developed.   Pulmonary:      Effort: No respiratory distress.   Psychiatric:         Behavior: Behavior normal.         Thought Content: Thought content normal.         Judgment: Judgment normal.               Assessment/Plan   Medicare Risks and Personalized Health Plan  CMS Preventative Services Quick Reference  Breast Cancer/Mammogram Screening  Cardiovascular risk  Colon Cancer Screening  Inactivity/Sedentary    The above risks/problems have been discussed with the patient.  Pertinent information has been shared with the patient in the After Visit Summary.  Follow up plans and orders are seen below in the Assessment/Plan Section.    Diagnoses and all orders for this visit:    1. Medicare annual wellness visit, subsequent (Primary)    2. Chronic idiopathic constipation  -     docusate sodium (COLACE) 50 MG capsule; Take 1 capsule  by mouth 2 (Two) Times a Day As Needed for Constipation.  Dispense: 60 capsule; Refill: 5    3. Acquired hypothyroidism  -     TSH  -     T4, Free    4. Seizure (CMS/HCC)  -     Comprehensive Metabolic Panel  -     CBC Auto Differential  -     Phenobarbital level    5. Hyperlipidemia, unspecified hyperlipidemia type  -     Lipid Panel    6. Vitamin D deficiency  -     Vitamin D 25 Hydroxy    7. Encounter for hepatitis C screening test for low risk patient  -     Hepatitis C Antibody    8. Screen for colon cancer  -     Cologuard - Stool, Per Rectum; Future    Pleasant 51-year-old female here to follow-up routinely, hypothyroidism well controlled, seizures well controlled on phenobarbital.  Labs as above.  Constipation not well controlled -having 1 bowel movement every 3 days.  Continue with prunes, increase fluids and stool softeners as above.  Follow-up if not improving.      The patient's mother and I discussed various screening options -overall Nallely does not tolerate any screening test that requires contact.  She can do labs with some restraint but would not be able to tolerate a mammogram without sedation.  She has had 1 mammogram when she was under sedation that was normal.  Also as she has never been sexually active and had normal Pap smears, plan to discontinue these as well.  She would be agreeable to do Cologuard and if necessary we can sedate for colonoscopy.  And possibly consider doing a mammogram if she is sedated for another reason.  Otherwise, plan to treat quality of life and treat other medical options.    Patient is unable to tolerate wearing a mask, okay to go to day program without a mask, she is fully vaccinated for COVID-19.    Follow Up:  Return in about 1 year (around 5/20/2022), or if symptoms worsen or fail to improve, for Annual Exam with fasting labs prior.     An After Visit Summary and PPPS were given to the patient.     Ariana Whitt MD    This was an audio and video enabled  telemedicine encounter.

## 2021-05-27 LAB
25(OH)D3+25(OH)D2 SERPL-MCNC: 54.7 NG/ML (ref 30–100)
ALBUMIN SERPL-MCNC: 4.7 G/DL (ref 3.5–5.2)
ALBUMIN/GLOB SERPL: 1.8 G/DL
ALP SERPL-CCNC: 86 U/L (ref 39–117)
ALT SERPL-CCNC: 14 U/L (ref 1–33)
AST SERPL-CCNC: 16 U/L (ref 1–32)
BASOPHILS # BLD AUTO: 0.01 10*3/MM3 (ref 0–0.2)
BASOPHILS NFR BLD AUTO: 0.1 % (ref 0–1.5)
BILIRUB SERPL-MCNC: 0.3 MG/DL (ref 0–1.2)
BUN SERPL-MCNC: 18 MG/DL (ref 6–20)
BUN/CREAT SERPL: 20.5 (ref 7–25)
CALCIUM SERPL-MCNC: 9.8 MG/DL (ref 8.6–10.5)
CHLORIDE SERPL-SCNC: 102 MMOL/L (ref 98–107)
CHOLEST SERPL-MCNC: 145 MG/DL (ref 0–200)
CO2 SERPL-SCNC: 21.9 MMOL/L (ref 22–29)
CREAT SERPL-MCNC: 0.88 MG/DL (ref 0.57–1)
EOSINOPHIL # BLD AUTO: 0 10*3/MM3 (ref 0–0.4)
EOSINOPHIL NFR BLD AUTO: 0 % (ref 0.3–6.2)
ERYTHROCYTE [DISTWIDTH] IN BLOOD BY AUTOMATED COUNT: 13.4 % (ref 12.3–15.4)
GLOBULIN SER CALC-MCNC: 2.6 GM/DL
GLUCOSE SERPL-MCNC: 124 MG/DL (ref 65–99)
HCT VFR BLD AUTO: 39.6 % (ref 34–46.6)
HCV AB S/CO SERPL IA: <0.1 S/CO RATIO (ref 0–0.9)
HDLC SERPL-MCNC: 57 MG/DL (ref 40–60)
HGB BLD-MCNC: 13.8 G/DL (ref 12–15.9)
IMM GRANULOCYTES # BLD AUTO: 0.02 10*3/MM3 (ref 0–0.05)
IMM GRANULOCYTES NFR BLD AUTO: 0.3 % (ref 0–0.5)
LDLC SERPL CALC-MCNC: 76 MG/DL (ref 0–100)
LYMPHOCYTES # BLD AUTO: 2.04 10*3/MM3 (ref 0.7–3.1)
LYMPHOCYTES NFR BLD AUTO: 29.6 % (ref 19.6–45.3)
MCH RBC QN AUTO: 30.9 PG (ref 26.6–33)
MCHC RBC AUTO-ENTMCNC: 34.8 G/DL (ref 31.5–35.7)
MCV RBC AUTO: 88.6 FL (ref 79–97)
MONOCYTES # BLD AUTO: 0.49 10*3/MM3 (ref 0.1–0.9)
MONOCYTES NFR BLD AUTO: 7.1 % (ref 5–12)
NEUTROPHILS # BLD AUTO: 4.34 10*3/MM3 (ref 1.7–7)
NEUTROPHILS NFR BLD AUTO: 62.9 % (ref 42.7–76)
NRBC BLD AUTO-RTO: 0 /100 WBC (ref 0–0.2)
PHENOBARB SERPL-MCNC: 31.7 MCG/ML (ref 10–30)
PLATELET # BLD AUTO: 377 10*3/MM3 (ref 140–450)
POTASSIUM SERPL-SCNC: 4.2 MMOL/L (ref 3.5–5.2)
PROT SERPL-MCNC: 7.3 G/DL (ref 6–8.5)
RBC # BLD AUTO: 4.47 10*6/MM3 (ref 3.77–5.28)
SODIUM SERPL-SCNC: 139 MMOL/L (ref 136–145)
T4 FREE SERPL-MCNC: 1.05 NG/DL (ref 0.93–1.7)
TRIGL SERPL-MCNC: 60 MG/DL (ref 0–150)
TSH SERPL DL<=0.005 MIU/L-ACNC: 3.49 UIU/ML (ref 0.27–4.2)
VLDLC SERPL CALC-MCNC: 12 MG/DL (ref 5–40)
WBC # BLD AUTO: 6.9 10*3/MM3 (ref 3.4–10.8)

## 2021-06-07 ENCOUNTER — TELEPHONE (OUTPATIENT)
Dept: NEUROLOGY | Facility: CLINIC | Age: 51
End: 2021-06-07

## 2021-06-07 DIAGNOSIS — G40.109 SEIZURE, TEMPORAL LOBE (HCC): ICD-10-CM

## 2021-06-08 ENCOUNTER — TELEPHONE (OUTPATIENT)
Dept: FAMILY MEDICINE CLINIC | Facility: CLINIC | Age: 51
End: 2021-06-08

## 2021-06-08 DIAGNOSIS — R56.9 SEIZURE (HCC): Primary | ICD-10-CM

## 2021-06-08 RX ORDER — PHENOBARBITAL 32.4 MG/1
TABLET ORAL
Qty: 30 TABLET | Refills: 0 | Status: SHIPPED | OUTPATIENT
Start: 2021-06-08 | End: 2021-07-08

## 2021-06-08 RX ORDER — PHENOBARBITAL 97.2 MG/1
TABLET ORAL
Qty: 30 TABLET | Refills: 0 | Status: SHIPPED | OUTPATIENT
Start: 2021-06-08 | End: 2021-07-08

## 2021-06-08 NOTE — TELEPHONE ENCOUNTER
PTS MOTHER CALLED TO STATE THAT THE PHENOBARBITAL LEVEL WAS A BIT HIGH ACCORDING TO THE PCP, 124.

## 2021-06-08 NOTE — TELEPHONE ENCOUNTER
Will you let the patient's mother know that her phenobarbital level was 31.7, normal is 10-30. I'd like to recheck this level in 2 weeks to make sure it is not continuing to increase. If it remains the same or less I am not going to make any changes, but if it has increased further I think we need to discuss lowering the dose of phenobarbital. Please also check with the mom to make sure she hasn't noticed any signs of phenobarbital toxicity. This includes gait changes, increased drowsiness, behavioral changes.          I spoke with patient's mom and gave her information.

## 2021-06-23 LAB — PHENOBARB SERPL-MCNC: 30.9 MCG/ML (ref 10–30)

## 2021-07-05 DIAGNOSIS — G40.109 SEIZURE, TEMPORAL LOBE (HCC): ICD-10-CM

## 2021-07-08 RX ORDER — PHENOBARBITAL 97.2 MG/1
TABLET ORAL
Qty: 30 TABLET | Refills: 0 | Status: SHIPPED | OUTPATIENT
Start: 2021-07-08 | End: 2021-07-26 | Stop reason: SDUPTHER

## 2021-07-08 RX ORDER — PHENOBARBITAL 32.4 MG/1
TABLET ORAL
Qty: 30 TABLET | Refills: 0 | Status: SHIPPED | OUTPATIENT
Start: 2021-07-08 | End: 2021-07-26 | Stop reason: SDUPTHER

## 2021-07-08 NOTE — TELEPHONE ENCOUNTER
PT MOM KAMILA  CALLED AND STATED THAT THEY STILL DO NOT HAVE PHENOBARBITAL RX. THEY WILL BE OUT AFTER TONIGHT AND NEED ASAP. ALSO HER BLOOD LEVEL WAS CHECKED  BY P.C.P. AND WAS STILL HIGH   (ON MY CHART) . 3RD PROBLEM IS THAT THEY ARE LEAVING TOWN AND NEED TO GET A 2 MONTH SUPPLY OF RX PHENOBARBITAL  AND SHE KNOW'S  IT HAS TO BE ORDERED BY A DOCTOR.      PLEASE ADVISE

## 2021-07-16 RX ORDER — OXYBUTYNIN CHLORIDE 10 MG/1
TABLET, EXTENDED RELEASE ORAL
Qty: 90 TABLET | Refills: 1 | Status: SHIPPED | OUTPATIENT
Start: 2021-07-16 | End: 2022-01-12

## 2021-07-21 DIAGNOSIS — E03.9 ACQUIRED HYPOTHYROIDISM: ICD-10-CM

## 2021-07-22 RX ORDER — LEVOTHYROXINE SODIUM 0.07 MG/1
TABLET ORAL
Qty: 90 TABLET | Refills: 8 | Status: SHIPPED | OUTPATIENT
Start: 2021-07-22 | End: 2022-10-14

## 2021-07-23 RX ORDER — PHENYTOIN 50 MG/1
50 TABLET, CHEWABLE ORAL DAILY
Qty: 90 TABLET | Refills: 2 | Status: SHIPPED | OUTPATIENT
Start: 2021-07-23 | End: 2021-09-07 | Stop reason: SDUPTHER

## 2021-07-23 RX ORDER — PHENYTOIN 50 MG/1
TABLET, CHEWABLE ORAL
Qty: 90 TABLET | Refills: 1 | OUTPATIENT
Start: 2021-07-23

## 2021-07-23 NOTE — TELEPHONE ENCOUNTER
[facility] [address] [place], [place] - [address] AT [facility] - [phone]  - [phone] FX    Relationship: Pharmacy    Best call back number: [phone]    Medication needed:   Requested Prescriptions     Pending Prescriptions Disp Refills   • phenytoin (DILANTIN) 50 MG chewable tablet 90 tablet 2     Sig: Chew 1 tablet Daily.       When do you need the refill by: ASAP    What additional details did the patient provide when requesting the medication: PT IS OUT     Does the patient have less than a 3 day supply:  [x] Yes  [] No    What is the patient's preferred pharmacy:   [facility] AS L[facility]ED

## 2021-07-23 NOTE — TELEPHONE ENCOUNTER
The note asks for phenobarbital refill, but I was sent a phenytoin refill.  Do you know which med needs to be filled?

## 2021-07-23 NOTE — TELEPHONE ENCOUNTER
Caller:KAMILA   Relationship: PTS MOTHER     Best call back number: [phone]    Medication needed:   Requested Prescriptions     Pending Prescriptions Disp Refills   • phenytoin (DILANTIN) 50 MG chewable tablet [Pharmacy Med Name: PHENYTOIN 50 MG TABLET CHEW] 90 tablet 1     Sig: CHEW ONE TABLET BY MOUTH DAILY       When do you need the refill by: TODAY    Does the patient have less than a 3 day supply:  [x] Yes  [] No    What is the patient's preferred pharmacy: [facility] [facility] [phone] [facility] [phone] FX     NEED REFILLED TODAY AS PT WILL TAKE LAST DOSE

## 2021-07-23 NOTE — TELEPHONE ENCOUNTER
Pt sees Frankie Rodriguez but, she is out. Pt is needing refill of phenobarbital today, she will run out this evening. There is phone note on  06/07/21 that her phenobarbital level was 31.7 and Frankie wanted it rechecked in 2 weeks - 06/22/21 phenobarbital level was 30.9. A refill has been sent since that draw. Will you review and approve if appropriate?      Thank you

## 2021-07-23 NOTE — TELEPHONE ENCOUNTER
Would you mind to review? Frankie Jennifer is out until Tuesday. Please see my previous message to Cyndee. She is out until Monday.      Thank you

## 2021-07-26 ENCOUNTER — TELEPHONE (OUTPATIENT)
Dept: NEUROLOGY | Facility: OTHER | Age: 51
End: 2021-07-26

## 2021-07-26 DIAGNOSIS — G40.109 SEIZURE, TEMPORAL LOBE (HCC): ICD-10-CM

## 2021-07-26 NOTE — TELEPHONE ENCOUNTER
Caller: Kamila Ochoa    Relationship: Mother    Best call back number: [phone]    Medication needed:   Requested Prescriptions     Pending Prescriptions Disp Refills   • PHENobarbital (LUMINAL) 97.2 MG tablet 30 tablet 0     Sig: Take 1 tablet by mouth Daily.   • PHENobarbital (LUMINAL) 32.4 MG tablet 30 tablet 0     Sig: Take 1 tablet by mouth Daily.       When do you need the refill by: BY THURSDAY    What additional details did the patient provide when requesting the medication: PATIENTS GUARDIAN KAMILA STATES THEY ARE LEAVING FOR VACATION ON Friday AND NEED TO REQUEST AN EARLY REFILL. PATIENT HAS ABOUT A WEEK TO TWO WEEKS. THEY ARE GOING ON VACATION FOR TWO WEEKS. SO WILL ONLY HAVE ENOUGH FOR ONE WEEK OF VACATION. PLEASE ADVISE.     Does the patient have less than a 3 day supply:  [] Yes  [x] No    What is the patient's preferred pharmacy: [facility] [address]  AT [facility] [phone] [facility] [phone] FX

## 2021-07-26 NOTE — TELEPHONE ENCOUNTER
PATIENTS MOTHER KAMILA ASKED ABOUT PHENYTONIN SAYING ONE REFILL WAS APPROVED ON 7-23-21 AND OTHER SAID DENIED. I LET HER KNOW MOST LIKELY IT WAS DOUBLE REQUEST FOR REFILL. SHE STATED THEY DID GET THE MEDICATION AT PHARMACY.

## 2021-07-27 RX ORDER — PHENOBARBITAL 32.4 MG/1
32.4 TABLET ORAL DAILY
Qty: 30 TABLET | Refills: 0 | Status: SHIPPED | OUTPATIENT
Start: 2021-07-27 | End: 2021-09-03

## 2021-07-27 RX ORDER — PHENOBARBITAL 97.2 MG/1
97.2 TABLET ORAL DAILY
Qty: 30 TABLET | Refills: 0 | Status: SHIPPED | OUTPATIENT
Start: 2021-07-27 | End: 2021-09-03

## 2021-08-17 ENCOUNTER — TELEPHONE (OUTPATIENT)
Dept: NEUROLOGY | Facility: CLINIC | Age: 51
End: 2021-08-17

## 2021-08-17 NOTE — TELEPHONE ENCOUNTER
Provider:  VINCENT AIKEN   Caller:  KAMILA   Relationship to Patient: MOTHER     Phone Number: [phone]  Reason for Call:  PTS MOTHER CALLING IN WITH CONCERN OVER UPCOMING APPT ON 09/7/2021 PT CAN NOT WEAR A MASK  PT WAS REQUESTING A TELEHEALTH  VISIT BUT HAS NOT BEEN IN OFFICE SINCE 09/11/2020  When was the patient last seen: 09/11/2020  MOTHER JUST WANTS TO CHECK AS PT CAN CAUSE A STIR WHEN  IN OFFICE  IF TELEHEALTH IS NOT AVAILABLE MAY WANT TO RESCHEDULE APPT FOR DIFFERENT TIME  ON 09/07/2021  PLEASE ADVISE   MAYBE EARLIEST APPT

## 2021-09-03 DIAGNOSIS — G40.109 SEIZURE, TEMPORAL LOBE (HCC): ICD-10-CM

## 2021-09-03 RX ORDER — PHENOBARBITAL 32.4 MG/1
TABLET ORAL
Qty: 30 TABLET | Refills: 0 | Status: SHIPPED | OUTPATIENT
Start: 2021-09-03 | End: 2021-09-07 | Stop reason: SDUPTHER

## 2021-09-03 RX ORDER — PHENOBARBITAL 97.2 MG/1
TABLET ORAL
Qty: 30 TABLET | Refills: 0 | Status: SHIPPED | OUTPATIENT
Start: 2021-09-03 | End: 2021-09-07 | Stop reason: SDUPTHER

## 2021-09-07 ENCOUNTER — TELEMEDICINE (OUTPATIENT)
Dept: NEUROLOGY | Facility: CLINIC | Age: 51
End: 2021-09-07

## 2021-09-07 DIAGNOSIS — G40.109 SEIZURE, TEMPORAL LOBE (HCC): ICD-10-CM

## 2021-09-07 DIAGNOSIS — G40.409 FAMILIAL TONIC CLONIC EPILEPSY (HCC): ICD-10-CM

## 2021-09-07 DIAGNOSIS — G80.9 CEREBRAL PALSY, UNSPECIFIED TYPE (HCC): Primary | ICD-10-CM

## 2021-09-07 PROCEDURE — 99213 OFFICE O/P EST LOW 20 MIN: CPT | Performed by: NURSE PRACTITIONER

## 2021-09-07 RX ORDER — PHENOBARBITAL 97.2 MG/1
97.2 TABLET ORAL DAILY
Qty: 30 TABLET | Refills: 0 | Status: SHIPPED | OUTPATIENT
Start: 2021-09-07 | End: 2021-09-30

## 2021-09-07 RX ORDER — PHENOBARBITAL 32.4 MG/1
32.4 TABLET ORAL DAILY
Qty: 30 TABLET | Refills: 0 | Status: SHIPPED | OUTPATIENT
Start: 2021-09-07 | End: 2021-09-30

## 2021-09-07 RX ORDER — PHENYTOIN 50 MG/1
50 TABLET, CHEWABLE ORAL DAILY
Qty: 90 TABLET | Refills: 2 | Status: SHIPPED | OUTPATIENT
Start: 2021-09-07 | End: 2022-09-27 | Stop reason: SDUPTHER

## 2021-09-07 NOTE — PROGRESS NOTES
Subjective   Nallely Ochoa is a 51 y.o. female presenting for follow up. Mother is present and does the speaking. The patient has a history of cerebral palsy, epilepsy, MRDD. She is nonverbal.      She has a history of generalized tonic-clonic seizures in childhood but she has been stable for years on her current medication regimen.  Her last seizure occurred years ago when a physician attempted to taper her off of the low dose of Dilantin.  She takes Dilantin 50 mg once daily as well as phenobarbital 32.4 mg tablet in addition to 97.2 mg tablet daily.  She has been doing well since her last visit.    Phenobarbital level was 31.7 on 5/26/21. I rechecked the level on 6/22/21 and it was 30.9. Upper limit of normal is 30.0. Mother denies any somnolence.     I performed this clinical encounter by utilizing a real-time telehealth video connection between my location and the patient's location at home.  I obtained verbal consent from the patient to perform this clinical encounter utilizing video and prepared the patient by answering any questions they had about the telehealth interaction.    Total time of video encounter was 20 minutes.   History of Present Illness     The following portions of the patient's history were reviewed and updated as appropriate: allergies, current medications, past family history, past medical history, past social history, past surgical history and problem list.    Review of Systems    Objective   Physical Exam  Unable to perform exam given that this was a video visit and that the patient is nonverbal and would not sit in front of the camera.    Assessment/Plan

## 2021-09-30 DIAGNOSIS — G40.109 SEIZURE, TEMPORAL LOBE (HCC): ICD-10-CM

## 2021-09-30 RX ORDER — PHENOBARBITAL 97.2 MG/1
TABLET ORAL
Qty: 30 TABLET | Refills: 0 | Status: SHIPPED | OUTPATIENT
Start: 2021-09-30 | End: 2021-11-04 | Stop reason: SDUPTHER

## 2021-09-30 RX ORDER — PHENOBARBITAL 32.4 MG/1
TABLET ORAL
Qty: 30 TABLET | Refills: 0 | Status: SHIPPED | OUTPATIENT
Start: 2021-09-30 | End: 2021-11-04 | Stop reason: SDUPTHER

## 2021-10-21 ENCOUNTER — FLU SHOT (OUTPATIENT)
Dept: FAMILY MEDICINE CLINIC | Facility: CLINIC | Age: 51
End: 2021-10-21

## 2021-10-21 DIAGNOSIS — Z23 NEED FOR VACCINATION: Primary | ICD-10-CM

## 2021-10-21 PROCEDURE — G0008 ADMIN INFLUENZA VIRUS VAC: HCPCS | Performed by: FAMILY MEDICINE

## 2021-10-21 PROCEDURE — 90686 IIV4 VACC NO PRSV 0.5 ML IM: CPT | Performed by: FAMILY MEDICINE

## 2021-11-04 DIAGNOSIS — G40.109 SEIZURE, TEMPORAL LOBE (HCC): ICD-10-CM

## 2021-11-04 RX ORDER — PHENOBARBITAL 97.2 MG/1
97.2 TABLET ORAL DAILY
Qty: 30 TABLET | Refills: 0 | Status: SHIPPED | OUTPATIENT
Start: 2021-11-04 | End: 2022-01-04

## 2021-11-04 RX ORDER — PHENOBARBITAL 32.4 MG/1
32.4 TABLET ORAL DAILY
Qty: 30 TABLET | Refills: 0 | Status: SHIPPED | OUTPATIENT
Start: 2021-11-04 | End: 2022-01-04

## 2022-01-02 DIAGNOSIS — G40.109 SEIZURE, TEMPORAL LOBE: ICD-10-CM

## 2022-01-04 RX ORDER — PHENOBARBITAL 97.2 MG/1
TABLET ORAL
Qty: 30 TABLET | Refills: 0 | Status: SHIPPED | OUTPATIENT
Start: 2022-01-04 | End: 2022-02-03

## 2022-01-04 RX ORDER — PHENOBARBITAL 32.4 MG/1
TABLET ORAL
Qty: 30 TABLET | Refills: 0 | Status: SHIPPED | OUTPATIENT
Start: 2022-01-04 | End: 2022-02-03

## 2022-01-12 RX ORDER — OXYBUTYNIN CHLORIDE 10 MG/1
TABLET, EXTENDED RELEASE ORAL
Qty: 90 TABLET | Refills: 0 | Status: SHIPPED | OUTPATIENT
Start: 2022-01-12 | End: 2022-04-14

## 2022-01-25 DIAGNOSIS — K59.04 CHRONIC IDIOPATHIC CONSTIPATION: ICD-10-CM

## 2022-01-26 RX ORDER — DOCUSATE SODIUM 50 MG/1
CAPSULE, LIQUID FILLED ORAL
Qty: 28 CAPSULE | Refills: 0 | Status: SHIPPED | OUTPATIENT
Start: 2022-01-26 | End: 2022-02-21

## 2022-02-02 DIAGNOSIS — G40.109 SEIZURE, TEMPORAL LOBE: ICD-10-CM

## 2022-02-03 RX ORDER — PHENOBARBITAL 32.4 MG/1
TABLET ORAL
Qty: 30 TABLET | Refills: 2 | Status: SHIPPED | OUTPATIENT
Start: 2022-02-03 | End: 2022-03-03 | Stop reason: SDUPTHER

## 2022-02-03 RX ORDER — PHENOBARBITAL 97.2 MG/1
TABLET ORAL
Qty: 30 TABLET | Refills: 2 | Status: SHIPPED | OUTPATIENT
Start: 2022-02-03 | End: 2022-03-03 | Stop reason: SDUPTHER

## 2022-02-21 DIAGNOSIS — K59.04 CHRONIC IDIOPATHIC CONSTIPATION: ICD-10-CM

## 2022-02-21 RX ORDER — DOCUSATE SODIUM 50 MG/1
CAPSULE, LIQUID FILLED ORAL
Qty: 28 CAPSULE | Refills: 0 | Status: SHIPPED | OUTPATIENT
Start: 2022-02-21 | End: 2022-03-22

## 2022-03-03 DIAGNOSIS — G40.109 SEIZURE, TEMPORAL LOBE: ICD-10-CM

## 2022-03-03 NOTE — TELEPHONE ENCOUNTER
----- Message from Manisha Shultz sent at 3/3/2022 11:32 AM EST -----  [facility] called regarding the two Pheno scripts sent, unfortunately Tamika Jimenez is not covered by the patients insurance and they need the scripts resent by Andry Walsh. Please advise.     Thank you.

## 2022-03-04 RX ORDER — PHENOBARBITAL 32.4 MG/1
32.4 TABLET ORAL DAILY
Qty: 30 TABLET | Refills: 2 | Status: SHIPPED | OUTPATIENT
Start: 2022-03-04 | End: 2022-08-31 | Stop reason: SDUPTHER

## 2022-03-04 RX ORDER — PHENOBARBITAL 97.2 MG/1
97.2 TABLET ORAL DAILY
Qty: 30 TABLET | Refills: 2 | Status: SHIPPED | OUTPATIENT
Start: 2022-03-04 | End: 2022-08-31 | Stop reason: SDUPTHER

## 2022-03-09 ENCOUNTER — TELEPHONE (OUTPATIENT)
Dept: FAMILY MEDICINE CLINIC | Facility: CLINIC | Age: 52
End: 2022-03-09

## 2022-03-09 NOTE — TELEPHONE ENCOUNTER
PATIENTS MOTHER CALLING WANTING TO KNOW IF  WOULD LIKE TO [facility] FOR HER [facility]ICAL IF SHE NEEDS TO BRING HER IN THAT WOULDN'T BE A PROBLEM.    PLEASE ADVISE  [phone]

## 2022-03-22 DIAGNOSIS — K59.04 CHRONIC IDIOPATHIC CONSTIPATION: ICD-10-CM

## 2022-03-22 RX ORDER — DOCUSATE SODIUM 50 MG/1
CAPSULE, LIQUID FILLED ORAL
Qty: 28 CAPSULE | Refills: 0 | Status: SHIPPED | OUTPATIENT
Start: 2022-03-22 | End: 2022-04-14

## 2022-04-12 ENCOUNTER — OFFICE VISIT (OUTPATIENT)
Dept: FAMILY MEDICINE CLINIC | Facility: CLINIC | Age: 52
End: 2022-04-12

## 2022-04-12 VITALS — WEIGHT: 110 LBS | BODY MASS INDEX: 22.22 KG/M2

## 2022-04-12 DIAGNOSIS — E78.5 HYPERLIPIDEMIA, UNSPECIFIED HYPERLIPIDEMIA TYPE: ICD-10-CM

## 2022-04-12 DIAGNOSIS — E55.9 VITAMIN D DEFICIENCY: ICD-10-CM

## 2022-04-12 DIAGNOSIS — E03.9 ACQUIRED HYPOTHYROIDISM: Primary | ICD-10-CM

## 2022-04-12 DIAGNOSIS — Z79.899 DRUG THERAPY: ICD-10-CM

## 2022-04-12 DIAGNOSIS — R56.9 SEIZURE: ICD-10-CM

## 2022-04-12 PROCEDURE — 99214 OFFICE O/P EST MOD 30 MIN: CPT | Performed by: FAMILY MEDICINE

## 2022-04-12 NOTE — PROGRESS NOTES
Chief Complaint  Hypothyroidism (Follow up doing well  has lost some weight  over a year )    Subjective          Nallely Ochoa presents to [facility]  History of Present Illness  Pleasant 51-year-old female here accompanied by her mother and family friend.  She unfortunately suffers from cerebral palsy and microcephaly.  She is nonverbal and unable to communicate history on her own.  History is complicated by her mother.      hypothyroidism which has been well controlled, no adverse effects to medication.  She has been well controlled over the last year.  She also is treated for seizures with phenobarbital which is well controlled.    She has been doing well overall with activity, eating well.    Objective   Vital Signs:   Wt 49.9 kg (110 lb)   BMI 22.22 kg/m²     BMI has not been calculated during today's encounter.       Physical Exam  Vitals and nursing note reviewed.   Constitutional:       General: She is not in acute distress.     Appearance: She is well-developed.      Comments: Some increased anxiety, mild combativeness.  Overall more cooperative than prior.   HENT:      Head: Normocephalic.      Nose: Nose normal.   Cardiovascular:      Rate and Rhythm: Normal rate and regular rhythm.      Heart sounds: Normal heart sounds. No murmur heard.  Pulmonary:      Effort: Pulmonary effort is normal. No respiratory distress.      Breath sounds: Normal breath sounds.   Musculoskeletal:         General: Normal range of motion.   Skin:     General: Skin is warm and dry.      Findings: No rash.   Neurological:      Mental Status: She is alert and oriented to person, place, and time.   Psychiatric:         Behavior: Behavior normal.         Thought Content: Thought content normal.         Judgment: Judgment normal.        Result Review :                 Assessment and Plan    Diagnoses and all orders for this visit:    1. Acquired hypothyroidism (Primary)  -     TSH  -     T4,  free  -     T3, free    2. Drug therapy  -     Comprehensive Metabolic Panel  -     CBC & Differential    3. Seizure (HCC)  -     Phenobarbital level    4. Vitamin D deficiency  -     Vitamin D 25 Hydroxy    5. Hyperlipidemia, unspecified hyperlipidemia type  -     Lipid Panel    Very pleasant 51-year-old female here to follow-up hypothyroidism well-controlled, seizures that are well controlled.  She is due for labs which we will set up when she is able to come directly in for blood work.  This decreases her anxiety and stress.  She has good care with her mother and goes to  program 3 days a week with improvement.  Health maintenance is up-to-date, okay to follow-up with me annually.      Follow Up   Return in about 1 year (around 4/12/2023), or if symptoms worsen or fail to improve, for Annual Exam with fasting labs prior.  Patient was given instructions and counseling regarding her condition or for health maintenance advice. Please see specific information pulled into the AVS if appropriate. Medical assistant and I wore mask and eyewear protection during entire encounter.  Patient wore mask.    Ariana Whitt MD          Answers for HPI/ROS submitted by the patient on 4/5/2022  What is the primary reason for your visit?: Physical

## 2022-04-13 DIAGNOSIS — K59.04 CHRONIC IDIOPATHIC CONSTIPATION: ICD-10-CM

## 2022-04-14 RX ORDER — OXYBUTYNIN CHLORIDE 10 MG/1
TABLET, EXTENDED RELEASE ORAL
Qty: 90 TABLET | Refills: 1 | Status: SHIPPED | OUTPATIENT
Start: 2022-04-14 | End: 2022-10-07

## 2022-04-14 RX ORDER — DOCUSATE SODIUM 50 MG/1
CAPSULE, LIQUID FILLED ORAL
Qty: 90 CAPSULE | Refills: 1 | Status: SHIPPED | OUTPATIENT
Start: 2022-04-14 | End: 2022-09-06

## 2022-04-14 NOTE — TELEPHONE ENCOUNTER
Rx Refill Note  Requested Prescriptions     Pending Prescriptions Disp Refills   • oxybutynin XL (DITROPAN-XL) 10 MG 24 hr tablet [Pharmacy Med Name: OXYBUTYNIN CL ER 10 MG TABLET] 90 tablet 0     Sig: TAKE ONE TABLET BY MOUTH DAILY   • Colace Clear 50 MG capsule [Pharmacy Med Name: COLACE CLEAR 50 MG SOFTGEL] 28 capsule 0     Sig: TAKE ONE CAPSULE BY MOUTH TWICE A DAY AS NEEDED FOR CONSTIPATION      Last office visit with prescribing clinician: 4/12/2022      Next office visit with prescribing clinician: Visit date not found            Ke Linn MA  04/14/22, 08:20 EDT

## 2022-04-21 LAB
25(OH)D3+25(OH)D2 SERPL-MCNC: 65 NG/ML (ref 30–100)
ALBUMIN SERPL-MCNC: 5 G/DL (ref 3.8–4.9)
ALBUMIN/GLOB SERPL: 2 {RATIO} (ref 1.2–2.2)
ALP SERPL-CCNC: 95 IU/L (ref 44–121)
ALT SERPL-CCNC: 15 IU/L (ref 0–32)
AST SERPL-CCNC: 21 IU/L (ref 0–40)
BASOPHILS # BLD AUTO: 0 X10E3/UL (ref 0–0.2)
BASOPHILS NFR BLD AUTO: 0 %
BILIRUB SERPL-MCNC: 0.3 MG/DL (ref 0–1.2)
BUN SERPL-MCNC: 14 MG/DL (ref 6–24)
BUN/CREAT SERPL: 16 (ref 9–23)
CALCIUM SERPL-MCNC: 10 MG/DL (ref 8.7–10.2)
CHLORIDE SERPL-SCNC: 101 MMOL/L (ref 96–106)
CHOLEST SERPL-MCNC: 157 MG/DL (ref 100–199)
CO2 SERPL-SCNC: 20 MMOL/L (ref 20–29)
CREAT SERPL-MCNC: 0.88 MG/DL (ref 0.57–1)
EGFRCR SERPLBLD CKD-EPI 2021: 79 ML/MIN/1.73
EOSINOPHIL # BLD AUTO: 0 X10E3/UL (ref 0–0.4)
EOSINOPHIL NFR BLD AUTO: 0 %
ERYTHROCYTE [DISTWIDTH] IN BLOOD BY AUTOMATED COUNT: 12.9 % (ref 11.7–15.4)
GLOBULIN SER CALC-MCNC: 2.5 G/DL (ref 1.5–4.5)
GLUCOSE SERPL-MCNC: 111 MG/DL (ref 65–99)
HCT VFR BLD AUTO: 41.3 % (ref 34–46.6)
HDLC SERPL-MCNC: 63 MG/DL
HGB BLD-MCNC: 14.1 G/DL (ref 11.1–15.9)
IMM GRANULOCYTES # BLD AUTO: 0 X10E3/UL (ref 0–0.1)
IMM GRANULOCYTES NFR BLD AUTO: 0 %
LDLC SERPL CALC-MCNC: 81 MG/DL (ref 0–99)
LYMPHOCYTES # BLD AUTO: 2.1 X10E3/UL (ref 0.7–3.1)
LYMPHOCYTES NFR BLD AUTO: 34 %
MCH RBC QN AUTO: 30.7 PG (ref 26.6–33)
MCHC RBC AUTO-ENTMCNC: 34.1 G/DL (ref 31.5–35.7)
MCV RBC AUTO: 90 FL (ref 79–97)
MONOCYTES # BLD AUTO: 0.4 X10E3/UL (ref 0.1–0.9)
MONOCYTES NFR BLD AUTO: 7 %
NEUTROPHILS # BLD AUTO: 3.6 X10E3/UL (ref 1.4–7)
NEUTROPHILS NFR BLD AUTO: 59 %
PHENOBARB SERPL-MCNC: 28 UG/ML (ref 15–40)
PLATELET # BLD AUTO: 256 X10E3/UL (ref 150–450)
POTASSIUM SERPL-SCNC: 4.9 MMOL/L (ref 3.5–5.2)
PROT SERPL-MCNC: 7.5 G/DL (ref 6–8.5)
RBC # BLD AUTO: 4.59 X10E6/UL (ref 3.77–5.28)
SODIUM SERPL-SCNC: 144 MMOL/L (ref 134–144)
T3FREE SERPL-MCNC: 2.6 PG/ML (ref 2–4.4)
T4 FREE SERPL-MCNC: 1.07 NG/DL (ref 0.82–1.77)
TRIGL SERPL-MCNC: 68 MG/DL (ref 0–149)
TSH SERPL DL<=0.005 MIU/L-ACNC: 3.61 UIU/ML (ref 0.45–4.5)
VLDLC SERPL CALC-MCNC: 13 MG/DL (ref 5–40)
WBC # BLD AUTO: 6.2 X10E3/UL (ref 3.4–10.8)

## 2022-05-04 ENCOUNTER — TELEPHONE (OUTPATIENT)
Dept: FAMILY MEDICINE CLINIC | Facility: CLINIC | Age: 52
End: 2022-05-04

## 2022-05-04 DIAGNOSIS — Z12.11 SCREEN FOR COLON CANCER: Primary | ICD-10-CM

## 2022-05-04 NOTE — TELEPHONE ENCOUNTER
Patient mother is wanting to know if Dr. Whitt still wants there to do the cologuard they had discussed it a year ago but she never received the kit. Please advise

## 2022-06-27 NOTE — TELEPHONE ENCOUNTER
Called pt mom let her know about culture results reviewed by dr prakash she would like to know what gonna be next    Detail Level: Detailed Add 09632 Cpt? (Important Note: In 2017 The Use Of 39284 Is Being Tracked By Cms To Determine Future Global Period Reimbursement For Global Periods): yes Wound Evaluated By: Yemi Morgan PA-C & Dr. Natali Banda Additional Comments: PT IS DOING WELL. ADVISED TO CONTINUE DOXYCYCLINE, ICE THE AREAS AS NEEDED AND KEEP HEAD ELEVATED. WILL RE-EVALUATE AT S/R.

## 2022-08-31 DIAGNOSIS — G40.109 SEIZURE, TEMPORAL LOBE: ICD-10-CM

## 2022-09-01 ENCOUNTER — TELEPHONE (OUTPATIENT)
Dept: NEUROLOGY | Facility: CLINIC | Age: 52
End: 2022-09-01

## 2022-09-01 RX ORDER — PHENOBARBITAL 32.4 MG/1
32.4 TABLET ORAL DAILY
Qty: 30 TABLET | Refills: 0 | Status: SHIPPED | OUTPATIENT
Start: 2022-09-01 | End: 2022-09-27 | Stop reason: SDUPTHER

## 2022-09-01 RX ORDER — PHENOBARBITAL 97.2 MG/1
97.2 TABLET ORAL DAILY
Qty: 30 TABLET | Refills: 0 | Status: SHIPPED | OUTPATIENT
Start: 2022-09-01 | End: 2022-09-27 | Stop reason: SDUPTHER

## 2022-09-01 NOTE — TELEPHONE ENCOUNTER
Provider: YVON  Caller: KAMILA PERES   Relationship to Patient: MOM  Pharmacy: N/A  Phone Number: [phone]  Reason for Call:  PATIENTS MOM TELEPHONED RE: CHANGE APPT 9/27/22 W/ DR SANZ TO TELEHEALTH VISIT. PATIENT REFUSES TO WEAR MASK.    PLEASE CALL & ADVISE     THANK YOU

## 2022-09-05 DIAGNOSIS — K59.04 CHRONIC IDIOPATHIC CONSTIPATION: ICD-10-CM

## 2022-09-06 NOTE — TELEPHONE ENCOUNTER
Rx Refill Note  Requested Prescriptions     Pending Prescriptions Disp Refills   • Colace Clear 50 MG capsule [Pharmacy Med Name: COLACE CLEAR 50 MG SOFTGEL] 56 capsule 0     Sig: TAKE ONE CAPSULE BY MOUTH TWICE A DAY AS NEEDED FOR CONSTIPATION      Last office visit with prescribing clinician: 4/12/2022      Next office visit with prescribing clinician: Visit date not found            Ke Linn MA  09/06/22, 08:44 EDT

## 2022-09-07 RX ORDER — DOCUSATE SODIUM 50 MG/1
CAPSULE, LIQUID FILLED ORAL
Qty: 56 CAPSULE | Refills: 0 | Status: SHIPPED | OUTPATIENT
Start: 2022-09-07 | End: 2022-10-07

## 2022-09-27 ENCOUNTER — TELEMEDICINE (OUTPATIENT)
Dept: NEUROLOGY | Facility: CLINIC | Age: 52
End: 2022-09-27

## 2022-09-27 DIAGNOSIS — G40.109 SEIZURE, TEMPORAL LOBE: ICD-10-CM

## 2022-09-27 DIAGNOSIS — G80.9 CEREBRAL PALSY, UNSPECIFIED TYPE: Primary | ICD-10-CM

## 2022-09-27 DIAGNOSIS — G40.409 FAMILIAL TONIC CLONIC EPILEPSY: ICD-10-CM

## 2022-09-27 PROCEDURE — 99213 OFFICE O/P EST LOW 20 MIN: CPT | Performed by: NURSE PRACTITIONER

## 2022-09-27 RX ORDER — PHENYTOIN 50 MG/1
50 TABLET, CHEWABLE ORAL DAILY
Qty: 90 TABLET | Refills: 3 | Status: SHIPPED | OUTPATIENT
Start: 2022-09-27 | End: 2022-10-03 | Stop reason: SDUPTHER

## 2022-09-27 RX ORDER — PHENOBARBITAL 32.4 MG/1
32.4 TABLET ORAL DAILY
Qty: 33 TABLET | Refills: 0 | Status: SHIPPED | OUTPATIENT
Start: 2022-09-27 | End: 2022-10-03 | Stop reason: SDUPTHER

## 2022-09-27 RX ORDER — PHENOBARBITAL 97.2 MG/1
97.2 TABLET ORAL DAILY
Qty: 30 TABLET | Refills: 0 | Status: SHIPPED | OUTPATIENT
Start: 2022-09-27 | End: 2022-10-03 | Stop reason: SDUPTHER

## 2022-09-27 NOTE — PROGRESS NOTES
Subjective   Nallely Ochoa is a 52 y.o. female presenting for follow-up.  The patient's mother is present and does the speaking during the visit today.  The patient has a history of cerebral palsy, epilepsy, MRDD.  She is nonverbal.  She has a history of generalized tonic-clonic seizures in childhood but has been stable for many years on her current medication regimen.  Her last seizure occurred years ago when a physician attempted to taper her off of the low-dose of Dilantin.  She is taking Dilantin 50 mg once daily as well as phenobarbital 32.4 mg tablet in addition to 97.2 mg tablet daily.  She has been doing well since her last visit.    I performed this clinical encounter by utilizing a real-time telehealth video connection between my location and the patient's location at home.  I obtained verbal consent from the patient to perform this clinical encounter utilizing video and prepared the patient by answering any questions they had about the telehealth interaction.        History of Present Illness     Review of Systems   Unable to perform ROS: Patient nonverbal       I have reviewed and confirmed the accuracy of the patient's history: Chief complaint, HPI, ROS, Subjective and Past Family Social History as entered by the MA/LPN/RN. Frankie Jennifermichael Pratt, APRN 09/27/22      Objective     Physical Examination:  Mental status: Awake, alert. Non verbal.    Limited exam due to this being a video visit and the patient being nonverbal.      Assessment & Plan   Diagnoses and all orders for this visit:    1. Cerebral palsy, unspecified type (HCC) (Primary)    2. Familial tonic clonic epilepsy (HCC)    3. Seizure, temporal lobe (HCC)  -     PHENobarbital (LUMINAL) 32.4 MG tablet; Take 1 tablet by mouth Daily.  Dispense: 33 tablet; Refill: 0  -     PHENobarbital (LUMINAL) 97.2 MG tablet; Take 1 tablet by mouth Daily.  Dispense: 30 tablet; Refill: 0    Other orders  -     phenytoin (DILANTIN) 50 MG chewable tablet; Chew  1 tablet Daily.  Dispense: 90 tablet; Refill: 3              She is doing well. No changes made today. She will continue Dilantin 50 mg once daily as well as phenobarbital 32.4 mg tablet in addition to the 97.2 mg tablet daily. Seizure precautions reviewed. She will call with any problems. Follow up annually.

## 2022-09-30 ENCOUNTER — TELEPHONE (OUTPATIENT)
Dept: NEUROLOGY | Facility: CLINIC | Age: 52
End: 2022-09-30

## 2022-10-03 DIAGNOSIS — G40.109 SEIZURE, TEMPORAL LOBE: ICD-10-CM

## 2022-10-03 RX ORDER — PHENOBARBITAL 32.4 MG/1
32.4 TABLET ORAL DAILY
Qty: 33 TABLET | Refills: 0 | Status: SHIPPED | OUTPATIENT
Start: 2022-10-03 | End: 2022-11-08

## 2022-10-03 RX ORDER — PHENYTOIN 50 MG/1
50 TABLET, CHEWABLE ORAL DAILY
Qty: 90 TABLET | Refills: 3 | Status: SHIPPED | OUTPATIENT
Start: 2022-10-03

## 2022-10-03 RX ORDER — PHENOBARBITAL 97.2 MG/1
97.2 TABLET ORAL DAILY
Qty: 30 TABLET | Refills: 0 | Status: SHIPPED | OUTPATIENT
Start: 2022-10-03 | End: 2022-11-07 | Stop reason: SDUPTHER

## 2022-10-07 DIAGNOSIS — K59.04 CHRONIC IDIOPATHIC CONSTIPATION: ICD-10-CM

## 2022-10-07 RX ORDER — DOCUSATE SODIUM 50 MG/1
CAPSULE, LIQUID FILLED ORAL
Qty: 56 CAPSULE | Refills: 0 | Status: SHIPPED | OUTPATIENT
Start: 2022-10-07

## 2022-10-07 RX ORDER — OXYBUTYNIN CHLORIDE 10 MG/1
TABLET, EXTENDED RELEASE ORAL
Qty: 90 TABLET | Refills: 1 | Status: SHIPPED | OUTPATIENT
Start: 2022-10-07

## 2022-10-07 NOTE — TELEPHONE ENCOUNTER
Rx Refill Note  Requested Prescriptions     Pending Prescriptions Disp Refills   • oxybutynin XL (DITROPAN-XL) 10 MG 24 hr tablet [Pharmacy Med Name: OXYBUTYNIN CL ER 10 MG TABLET] 90 tablet 1     Sig: TAKE ONE TABLET BY MOUTH DAILY   • Colace Clear 50 MG capsule [Pharmacy Med Name: COLACE CLEAR 50 MG SOFTGEL] 56 capsule 0     Sig: TAKE ONE CAPSULE BY MOUTH TWICE A DAY AS NEEDED FOR CONSTIPATION      Last office visit with prescribing clinician: 4/12/2022      Next office visit with prescribing clinician: Visit date not found            Sherie Haley MA  10/07/22, 16:15 EDT

## 2022-10-14 DIAGNOSIS — E03.9 ACQUIRED HYPOTHYROIDISM: ICD-10-CM

## 2022-10-14 RX ORDER — LEVOTHYROXINE SODIUM 0.07 MG/1
TABLET ORAL
Qty: 90 TABLET | Refills: 8 | Status: SHIPPED | OUTPATIENT
Start: 2022-10-14

## 2022-10-19 ENCOUNTER — TELEPHONE (OUTPATIENT)
Dept: FAMILY MEDICINE CLINIC | Facility: CLINIC | Age: 52
End: 2022-10-19

## 2022-10-19 NOTE — TELEPHONE ENCOUNTER
Caller: Analilia Ochoa    Relationship: Mother    Best call back number: [phone]    What was the call regarding: PATIENTS MOTHER STATED PATIENT RECEIVED HER THIRD COVID BOOSTER AND FLU SHOT YESTERDAY 10/18/22 AT THE [facility].

## 2022-10-26 ENCOUNTER — TELEPHONE (OUTPATIENT)
Dept: FAMILY MEDICINE CLINIC | Facility: CLINIC | Age: 52
End: 2022-10-26

## 2022-10-26 NOTE — TELEPHONE ENCOUNTER
This is the [employer] message sent by Holly's Mom. May a provider review recent [employer] message in regards to constipation and have clinical contact Analilia (mom)? Please advise.     Dr Whitt--Nallely continues to have constipation issues even though she is eating lots of fruit--7prunes--and both her day program and I are pushing fluids. I have resorted to dulcolax suppositories if she has gone 3 or 4 days but I know I can’t make that a regular habit.  Help! Analilia

## 2022-10-27 NOTE — TELEPHONE ENCOUNTER
Patient's mother called again, she has given Nallely *Dulcolax suppository yesterday. Mother stated it worked right away but does not think it helped. She would like to know if there is a laxative that she can be given to help with constipation. Mother has several questions and does not want to choose an OTC laxative.    Please advise.

## 2022-11-06 DIAGNOSIS — G40.109 SEIZURE, TEMPORAL LOBE: ICD-10-CM

## 2022-11-07 DIAGNOSIS — G40.109 SEIZURE, TEMPORAL LOBE: ICD-10-CM

## 2022-11-07 NOTE — TELEPHONE ENCOUNTER
URGENT3}    Medication requested (name and dose):    PHENOBARBITAL 97.2 MG TABLET    PHENOBARBITAL 32.4 MG TABLET    Pharmacy where request should be sent:    [facility] [id number] [address] [place] AT [facility] - [phone]  - [phone]       Additional details provided by patient:  PHARMACY IS ALSO SENDING AN URGENT REQUEST    Best call back number: [phone]    Does the patient have less than a 3 day supply:  [x] Yes  [] No    Manisha Laureano Rep  11/07/22, 12:01 [id number]}

## 2022-11-08 ENCOUNTER — TELEMEDICINE (OUTPATIENT)
Dept: FAMILY MEDICINE CLINIC | Facility: CLINIC | Age: 52
End: 2022-11-08

## 2022-11-08 VITALS — WEIGHT: 115 LBS | BODY MASS INDEX: 23.23 KG/M2

## 2022-11-08 DIAGNOSIS — K59.04 CHRONIC IDIOPATHIC CONSTIPATION: Primary | ICD-10-CM

## 2022-11-08 PROCEDURE — 99213 OFFICE O/P EST LOW 20 MIN: CPT | Performed by: FAMILY MEDICINE

## 2022-11-08 RX ORDER — PHENOBARBITAL 32.4 MG/1
32.4 TABLET ORAL DAILY
Qty: 33 TABLET | Refills: 0 | OUTPATIENT
Start: 2022-11-08

## 2022-11-08 RX ORDER — AMOXICILLIN 250 MG
1 CAPSULE ORAL 2 TIMES DAILY
Qty: 60 TABLET | Refills: 2 | Status: SHIPPED | OUTPATIENT
Start: 2022-11-08 | End: 2023-02-06

## 2022-11-08 RX ORDER — PHENOBARBITAL 32.4 MG/1
TABLET ORAL
Qty: 30 TABLET | Refills: 0 | Status: SHIPPED | OUTPATIENT
Start: 2022-11-08 | End: 2022-12-01

## 2022-11-08 RX ORDER — PHENOBARBITAL 97.2 MG/1
97.2 TABLET ORAL DAILY
Qty: 30 TABLET | Refills: 0 | Status: SHIPPED | OUTPATIENT
Start: 2022-11-08 | End: 2022-12-30

## 2022-11-08 NOTE — PROGRESS NOTES
"You have chosen to receive care through a telephone visit. Do you consent to use a telephone visit for your medical care today? {YES NO:12609::\"Yes\"}Chief Complaint  Constipation (Is trying  some dr advise and they not working anymore  )    Subjective    {Problem List  Visit Diagnosis   Encounters  Notes  Medications  Labs  Result Review Imaging  Media :23}    Nallely Ochoa presents to [facility]  History of Present Illness    Objective   Vital Signs:  There were no vitals taken for this visit.  Estimated body mass index is 22.22 kg/m² as calculated from the following:    Height as of 4/12/19: 149.9 cm (59\").    Weight as of 4/12/22: 49.9 kg (110 lb).    {BMI Follow-up (Optional) - Do not select before height/weight have been entered for this visit:91576}      Physical Exam   Result Review :{Labs  Result Review  Imaging  Med Tab  Media  Procedures  :23}  {The following data was reviewed by (Optional):89003}  {Ambulatory Labs (Optional):82788}  {Data reviewed (Optional):70172:::1}          Assessment and Plan {CC Problem List  Visit Diagnosis   ROS  Review (Popup)  Health Maintenance  Quality  BestPractice  Medications  SmartSets  SnapShot Encounters  Media :23}  There are no diagnoses linked to this encounter.       {Time Spent (Optional):28710}  Follow Up {Instructions Charge Capture  Follow-up Communications :23}  No follow-ups on file.  Patient was given instructions and counseling regarding her condition or for health maintenance advice. Please see specific information pulled into the AVS if appropriate.       "

## 2022-11-08 NOTE — PROGRESS NOTES
"Chief Complaint  Constipation (Is trying  some dr advise and they not working anymore  )    Subjective        Nallely Ochoa presents to [facility]IMARY [facility]  History of Present Illness  Pleasant 32-year-old female here for constipation.  Patient is present for the encounter, however history provided by her mother -patient is nonverbal with microcephaly and cerebral palsy.    Constipation, she has been battling this for some months now, they have done water, fiber (7 prunes a day), she is eating more fruit,  stool softeners twice a day, and MiraLAX with no improvement. She has been using dulcolax suppositories but will be 3-4 days before a regular BM. It is hard stool, large, often has to break with a plastic knife to flush. She does not seem to be in pain, but maybe she would be more quiet.     Objective   Vital Signs:  Wt 52.2 kg (115 lb)   BMI 23.23 kg/m²   Estimated body mass index is 23.23 kg/m² as calculated from the following:    Height as of 4/12/19: 149.9 cm (59\").    Weight as of this encounter: 52.2 kg (115 lb).          Physical Exam  Constitutional:       General: She is not in acute distress.     Appearance: She is well-developed.   Pulmonary:      Effort: No respiratory distress.   Psychiatric:         Behavior: Behavior normal.         Thought Content: Thought content normal.         Judgment: Judgment normal.        Result Review :                Assessment and Plan   Diagnoses and all orders for this visit:    1. Chronic idiopathic constipation (Primary)  -     sennosides-docusate (senna-docusate sodium) 8.6-50 MG per tablet; Take 1 tablet by mouth 2 (Two) Times a Day.  Dispense: 60 tablet; Refill: 2    severe Constipation every 3-4 days, it has been a chronic issue since childhood.  She is walking regularly, drinking water, fiver, increase colace to senna, ok to add doculax in 2 weeks.  OK to use a suppository on the 4t day if no BM.  If not improving I would consider " other prescription mends and a possible referral to GI.  We discussed possibly considering a CT scan of the abdomen but she would need to be sedated due to her cognitive limitations.  As she is not experiencing pain I think we will defer to less invasive options.       Follow Up   Return if symptoms worsen or fail to improve.  Patient was given instructions and counseling regarding her condition or for health maintenance advice. Please see specific information pulled into the AVS if appropriate.   This was an audio and video enabled telemedicine encounter.    You have chosen to receive care through a telephone visit. Do you consent to use a telephone visit for your medical care today? Yes    Ariana Whitt MD

## 2022-11-29 DIAGNOSIS — G40.109 SEIZURE, TEMPORAL LOBE: ICD-10-CM

## 2022-12-01 RX ORDER — PHENOBARBITAL 32.4 MG/1
TABLET ORAL
Qty: 30 TABLET | Refills: 0 | Status: SHIPPED | OUTPATIENT
Start: 2022-12-01 | End: 2022-12-30

## 2022-12-29 DIAGNOSIS — G40.109 SEIZURE, TEMPORAL LOBE: ICD-10-CM

## 2022-12-30 RX ORDER — PHENOBARBITAL 97.2 MG/1
97.2 TABLET ORAL DAILY
Qty: 30 TABLET | Refills: 0 | Status: SHIPPED | OUTPATIENT
Start: 2022-12-30

## 2022-12-30 RX ORDER — PHENOBARBITAL 32.4 MG/1
32.4 TABLET ORAL DAILY
Qty: 30 TABLET | Refills: 0 | Status: SHIPPED | OUTPATIENT
Start: 2022-12-30 | End: 2023-02-28

## 2022-12-30 RX ORDER — PHENOBARBITAL 32.4 MG/1
TABLET ORAL
Qty: 30 TABLET | Refills: 0 | Status: SHIPPED | OUTPATIENT
Start: 2022-12-30

## 2022-12-30 RX ORDER — PHENOBARBITAL 97.2 MG/1
TABLET ORAL
Qty: 30 TABLET | Refills: 0 | Status: SHIPPED | OUTPATIENT
Start: 2022-12-30

## 2023-02-05 DIAGNOSIS — K59.04 CHRONIC IDIOPATHIC CONSTIPATION: ICD-10-CM

## 2023-02-06 RX ORDER — DOCUSATE SODIUM, SENNOSIDES 50; 8.6 MG/1; MG/1
TABLET ORAL
Qty: 60 TABLET | Refills: 2 | Status: SHIPPED | OUTPATIENT
Start: 2023-02-06

## 2023-02-27 DIAGNOSIS — G40.109 SEIZURE, TEMPORAL LOBE: ICD-10-CM

## 2023-02-28 RX ORDER — PHENOBARBITAL 97.2 MG/1
TABLET ORAL
Qty: 30 TABLET | Refills: 0 | Status: SHIPPED | OUTPATIENT
Start: 2023-02-28

## 2023-02-28 RX ORDER — PHENOBARBITAL 32.4 MG/1
TABLET ORAL
Qty: 30 TABLET | Refills: 0 | Status: SHIPPED | OUTPATIENT
Start: 2023-02-28

## 2023-03-06 ENCOUNTER — TELEPHONE (OUTPATIENT)
Dept: FAMILY MEDICINE CLINIC | Facility: CLINIC | Age: 53
End: 2023-03-06
Payer: MEDICARE

## 2023-03-06 NOTE — TELEPHONE ENCOUNTER
Analilia called, Holly is due around late April, early May, possibly for a physical? Analilia states that over all Holly is in good health. Analilia is curious if Dr.Wanda Littlejohn would like to see Holly in the office or is it possible to schedule a tele-health appointment.

## 2023-03-07 NOTE — TELEPHONE ENCOUNTER
He has some happy to do so, and I am glad to approve a televisit, we can alternate every other year in person and by televisit

## 2023-03-29 RX ORDER — PHENOBARBITAL 97.2 MG/1
TABLET ORAL
Qty: 30 TABLET | Refills: 0 | Status: SHIPPED | OUTPATIENT
Start: 2023-03-29

## 2023-03-29 RX ORDER — PHENOBARBITAL 32.4 MG/1
TABLET ORAL
Qty: 30 TABLET | Refills: 0 | Status: SHIPPED | OUTPATIENT
Start: 2023-03-29

## 2023-04-07 RX ORDER — OXYBUTYNIN CHLORIDE 10 MG/1
TABLET, EXTENDED RELEASE ORAL
Qty: 90 TABLET | Refills: 1 | Status: SHIPPED | OUTPATIENT
Start: 2023-04-07

## 2023-04-25 RX ORDER — PHENOBARBITAL 32.4 MG/1
TABLET ORAL
Qty: 30 TABLET | Refills: 0 | Status: SHIPPED | OUTPATIENT
Start: 2023-04-25

## 2023-04-25 RX ORDER — PHENOBARBITAL 97.2 MG/1
TABLET ORAL
Qty: 30 TABLET | Refills: 0 | Status: SHIPPED | OUTPATIENT
Start: 2023-04-25

## 2023-05-01 ENCOUNTER — TELEMEDICINE (OUTPATIENT)
Dept: FAMILY MEDICINE CLINIC | Facility: CLINIC | Age: 53
End: 2023-05-01
Payer: MEDICARE

## 2023-05-01 VITALS — TEMPERATURE: 97.9 F | HEIGHT: 59 IN | BODY MASS INDEX: 24.19 KG/M2 | WEIGHT: 120 LBS

## 2023-05-01 DIAGNOSIS — K59.04 CHRONIC IDIOPATHIC CONSTIPATION: ICD-10-CM

## 2023-05-01 DIAGNOSIS — Z00.00 MEDICARE ANNUAL WELLNESS VISIT, SUBSEQUENT: Primary | ICD-10-CM

## 2023-05-01 DIAGNOSIS — E03.9 ACQUIRED HYPOTHYROIDISM: ICD-10-CM

## 2023-05-01 DIAGNOSIS — R41.89 BEHAVIOR RELATED TO COGNITIVE IMPAIRMENT: ICD-10-CM

## 2023-05-01 RX ORDER — LEVOTHYROXINE SODIUM 0.07 MG/1
75 TABLET ORAL DAILY
Qty: 90 TABLET | Refills: 3 | Status: SHIPPED | OUTPATIENT
Start: 2023-05-01

## 2023-05-01 RX ORDER — AMOXICILLIN 250 MG
1 CAPSULE ORAL 2 TIMES DAILY
Qty: 180 TABLET | Refills: 3 | Status: SHIPPED | OUTPATIENT
Start: 2023-05-01

## 2023-05-01 RX ORDER — DIAZEPAM 5 MG/1
5 TABLET ORAL AS NEEDED
Qty: 4 TABLET | Refills: 0 | Status: SHIPPED | OUTPATIENT
Start: 2023-05-01

## 2023-05-01 NOTE — PROGRESS NOTES
"Hannah so Nallely is Medicare AMB so we cannot do it, Chief Complaint  Annual Exam (Nothing new )    Subjective    {Problem List  Visit Diagnosis   Encounters  Notes  Medications  Labs  Result Review Imaging  Media :23}    Nallely Ochoa presents to [facility] PRIMARY [facility]  History of Present Illness    Objective   Vital Signs:  Ht 149.9 cm (59\")   Wt 54.4 kg (120 lb)   BMI 24.24 kg/m²   Estimated body mass index is 24.24 kg/m² as calculated from the following:    Height as of this encounter: 149.9 cm (59\").    Weight as of this encounter: 54.4 kg (120 lb).       BMI is within normal parameters. No other follow-up for BMI required.      Physical Exam   Result Review :{Labs  Result Review  Imaging  Med Tab  Media  Procedures  :23}  {The following data was reviewed by (Optional):07877}  {Ambulatory Labs (Optional):76216}  {Data reviewed (Optional):51652:::1}             Assessment and Plan {CC Problem List  Visit Diagnosis   ROS  Review (Popup)  Health Maintenance  Quality  BestPractice  Medications  SmartSets  SnapShot Encounters  Media :23}  There are no diagnoses linked to this encounter.       {Time Spent (Optional):81856}  Follow Up {Instructions Charge Capture  Follow-up Communications :23}  No follow-ups on file.  Patient was given instructions and counseling regarding her condition or for health maintenance advice. Please see specific information pulled into the AVS if appropriate.       " done

## 2023-05-01 NOTE — PROGRESS NOTES
The ABCs of the Annual Wellness Visit  Subsequent Medicare Wellness Visit    Subjective      Nallely Ochoa is a 53 y.o. female who presents for a Subsequent Medicare Wellness Visit.    No problems currently     Tried protein powder and would not take it unless it was in ice creams so they stopped that.  She feels that her weight is stable - her clothing has fit consistently and has not changed.      Constipation is improving - the senna S is helping a lot with constipation and does usually go once a day, and sometimes every other day.  It is a huge improvement from what it was. She is not having any hesitancy going to the restroom.     Behavior concerns: she does act out with hitting,, screaming, often needing for people to hold her down for labs, dental cleanings.  During other times she is very well behaved, during her day program she does not have any outbursts.  She does not need something on a ongoing basis but episodically.  She has been given some medication something that started with a C in the past that was not very helpful.    The following portions of the patient's history were reviewed and   updated as appropriate: allergies, current medications, past family history, past medical history, past social history, past surgical history and problem list.    Compared to one year ago, the patient feels her physical   health is the same.    Compared to one year ago, the patient feels her mental   health is the same.    Recent Hospitalizations:  She was not admitted to the hospital during the last year.       Current Medical Providers:  Patient Care Team:  Ariana Littlejohn MD as PCP - General (Family Medicine)    Outpatient Medications Prior to Visit   Medication Sig Dispense Refill   • loratadine (CLARITIN) 10 MG tablet Take 1 tablet by mouth Every Night.     • Multiple Vitamins-Minerals (MULTI-VITAMIN GUMMIES PO) Take 1 tablet by mouth Daily.     • oxybutynin XL (DITROPAN-XL) 10 MG 24 hr tablet TAKE ONE  TABLET BY MOUTH DAILY 90 tablet 1   • PHENobarbital (LUMINAL) 32.4 MG tablet TAKE ONE TABLET BY MOUTH DAILY 30 tablet 0   • PHENobarbital (LUMINAL) 32.4 MG tablet TAKE ONE TABLET BY MOUTH DAILY 30 tablet 0   • PHENobarbital (LUMINAL) 32.4 MG tablet TAKE ONE TABLET BY MOUTH DAILY 30 tablet 0   • PHENobarbital (LUMINAL) 32.4 MG tablet TAKE ONE TABLET BY MOUTH DAILY 30 tablet 0   • PHENobarbital (LUMINAL) 97.2 MG tablet TAKE ONE TABLET BY MOUTH DAILY 30 tablet 0   • PHENobarbital (LUMINAL) 97.2 MG tablet Take 1 tablet by mouth Daily. 30 tablet 0   • PHENobarbital (LUMINAL) 97.2 MG tablet TAKE ONE TABLET BY MOUTH DAILY 30 tablet 0   • PHENobarbital (LUMINAL) 97.2 MG tablet TAKE ONE TABLET BY MOUTH DAILY 30 tablet 0   • PHENobarbital (LUMINAL) 97.2 MG tablet TAKE ONE TABLET BY MOUTH DAILY 30 tablet 0   • phenytoin (DILANTIN) 50 MG chewable tablet Chew 1 tablet Daily. 90 tablet 3   • levothyroxine (SYNTHROID, LEVOTHROID) 75 MCG tablet TAKE ONE TABLET BY MOUTH DAILY 90 tablet 8   • Senna-Plus 8.6-50 MG per tablet TAKE ONE TABLET BY MOUTH TWICE A DAY 60 tablet 2   • Colace Clear 50 MG capsule TAKE ONE CAPSULE BY MOUTH TWICE A DAY AS NEEDED FOR CONSTIPATION 56 capsule 0     No facility-administered medications prior to visit.       No opioid medication identified on active medication list. I have reviewed chart for other potential  high risk medication/s and harmful drug interactions in the elderly.          Aspirin is not on active medication list.  Aspirin use is not indicated based on review of current medical condition/s. Risk of harm outweighs potential benefits.  .    Patient Active Problem List   Diagnosis   • Cerebral palsy   • Seizure   • Hypothyroidism   • Familial tonic clonic epilepsy   • Vitamin D deficiency   • Drug therapy   • Cataract   • Microcephalic   • Perimenopausal   • Combative behavior   • Disorder of eyelid   • Does not speak   • Monocular esotropia   • Cerebral palsy   • Behavior related to  "cognitive impairment     Advance Care Planning   Advance Care Planning     Advance Directive is not on file.  ACP discussion was held with the patient during this visit. Patient has an advance directive (not in EMR), copy requested.     Objective    Vitals:    23 1133   Temp: 97.9 °F (36.6 °C)   Weight: 54.4 kg (120 lb)   Height: 149.9 cm (59\")   PainSc: 0-No pain     Estimated body mass index is 24.24 kg/m² as calculated from the following:    Height as of this encounter: 149.9 cm (59\").    Weight as of this encounter: 54.4 kg (120 lb).    BMI is within normal parameters. No other follow-up for BMI required.  Physical Exam  Constitutional:       General: She is not in acute distress.     Appearance: She is well-developed.   Pulmonary:      Effort: No respiratory distress.   Psychiatric:         Behavior: Behavior normal.         Thought Content: Thought content normal.         Judgment: Judgment normal.      Comments: Patient is upset that her mother is talking on the computer, throwing napkins, objects, trying to close the computer           Does the patient have evidence of cognitive impairment?   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  No changes.            HEALTH RISK ASSESSMENT    Smoking Status:  Social History     Tobacco Use   Smoking Status Never   Smokeless Tobacco Never     Alcohol Consumption:  Social History     Substance and Sexual Activity   Alcohol Use No     Fall Risk Screen:    STEADI Fall Risk Assessment was completed, and patient is at LOW risk for falls.Assessment completed on:2023    Depression Screenin/1/2023    11:35 AM   PHQ-2/PHQ-9 Depression Screening   Little Interest or Pleasure in Doing Things 0-->not at all   Feeling Down, Depressed or Hopeless 0-->not at all   PHQ-9: Brief Depression Severity Measure Score 0       Health Habits and Functional and Cognitive Screenin/20/2021     8:00 AM   Functional & Cognitive Status   Do you have " difficulty preparing food and eating? Yes   Do you have difficulty bathing yourself, getting dressed or grooming yourself? Yes   Do you have difficulty using the toilet? Yes   Do you have difficulty moving around from place to place? Yes   Do you have trouble with steps or getting out of a bed or a chair? Yes   Current Diet Well Balanced Diet   Dental Exam Unknown   Eye Exam Not up to date   Exercise (times per week) 2 times per week   Current Exercises Include Walking   Do you need help using the phone?  Yes   Are you deaf or do you have serious difficulty hearing?  Yes   Do you need help with transportation? Yes   Do you need help shopping? Yes   Do you need help preparing meals?  Yes   Do you need help with housework?  Yes   Do you need help with laundry? Yes   Do you need help taking your medications? Yes   Do you need help managing money? Yes   Have you felt unusual stress, anger or loneliness in the last month? Yes   Who do you live with? Other   If you need help, do you have trouble finding someone available to you? No   Have you been bothered in the last four weeks by sexual problems? No   Do you have difficulty concentrating, remembering or making decisions? Yes       Age-appropriate Screening Schedule:  Refer to the list below for future screening recommendations based on patient's age, sex and/or medical conditions. Orders for these recommended tests are listed in the plan section. The patient has been provided with a written plan.    Health Maintenance   Topic Date Due   • TDAP/TD VACCINES (1 - Tdap) Never done   • LIPID PANEL  04/20/2023   • INFLUENZA VACCINE  08/01/2023   • ANNUAL WELLNESS VISIT  05/01/2024   • COLORECTAL CANCER SCREENING  05/25/2025   • HEPATITIS C SCREENING  Completed   • COVID-19 Vaccine  Completed   • ZOSTER VACCINE  Completed   • Pneumococcal Vaccine 0-64  Aged Out   • MAMMOGRAM  Discontinued   • PAP SMEAR  Discontinued                  CMS Preventative Services Quick  Reference  Risk Factors Identified During Encounter:    None Identified    The above risks/problems have been discussed with the patient.  Pertinent information has been shared with the patient in the After Visit Summary.    Diagnoses and all orders for this visit:    1. Medicare annual wellness visit, subsequent (Primary)    2. Chronic idiopathic constipation  -     sennosides-docusate (Senna-Plus) 8.6-50 MG per tablet; Take 1 tablet by mouth 2 (Two) Times a Day.  Dispense: 180 tablet; Refill: 3    3. Behavior related to cognitive impairment  -     diazePAM (Valium) 5 MG tablet; Take 1 tablet by mouth As Needed for Anxiety (before doctor and dental visits.).  Dispense: 4 tablet; Refill: 0    4. Acquired hypothyroidism  -     levothyroxine (SYNTHROID, LEVOTHROID) 75 MCG tablet; Take 1 tablet by mouth Daily.  Dispense: 90 tablet; Refill: 3    Nallely is a pleasant 53-year-old woman who does have severe cognitive delay, nonverbal with microcephaly and cerebral palsy.  All history is provided by her mother.  During most of the day her behavior is good, however if there is any doctors visits, dental visit she has significant behavior outburst.  She often does need to be sedated for dental work.  I am glad to try a dose of Valium above to see if this improves some of her behaviors so she does not need to be sedated as frequently.  Hopefully this will help us get labs as well.  She has been very stable with no clinical changes okay to do labs every 2 years.    Constipation improving with senna plus, okay to continue with this, water and fiber as she is doing now.  She usually has a bowel movement daily if not every other day.    Hypothyroidism well-controlled levothyroxine.    She does see a neurologist for seizures which are also well controlled-she has been on the same dose of phenytoin for years and phenobarbital.    Follow Up:   Next Medicare Wellness visit to be scheduled in 1 year.      An After Visit Summary and  PPPS were made available to the patient.    You have chosen to receive care through a telehealth visit.  Do you consent to use a video/audio connection for your medical care today? Yes    Patient and her mother were in their home, provider in the office.  Mother provided history as patient is nonverbal.    Ariana Littlejohn MD

## 2023-05-18 ENCOUNTER — TELEPHONE (OUTPATIENT)
Dept: FAMILY MEDICINE CLINIC | Facility: CLINIC | Age: 53
End: 2023-05-18
Payer: MEDICARE

## 2023-05-18 DIAGNOSIS — K59.04 CHRONIC IDIOPATHIC CONSTIPATION: ICD-10-CM

## 2023-05-18 NOTE — TELEPHONE ENCOUNTER
Caller: Analilia Ochoa    Relationship: Mother    Best call back number: [phone]    What is the best time to reach you: ANYTIME    Who are you requesting to speak with (clinical staff, provider,  specific staff member): CLINICAL    What was the call regarding: PATIENT'S MOTHER IS REQUESTING IF THE PATIENT'S sennosides-docusate (Senna-Plus) 8.6-50 MG per tablet CAN BE REFILLED AS THE OTHER MEDICATION PRESCRIBED IS NOT WORKING AND PATIENT HAS NOT GONE TO BATHROOM IN 4 DAYS. REQUESTING A CALLBACK FOR UPDATES.

## 2023-05-19 RX ORDER — AMOXICILLIN 250 MG
1 CAPSULE ORAL 2 TIMES DAILY
Qty: 180 TABLET | Refills: 3 | Status: SHIPPED | OUTPATIENT
Start: 2023-05-19

## 2023-05-30 RX ORDER — PHENOBARBITAL 32.4 MG/1
TABLET ORAL
Qty: 30 TABLET | Refills: 0 | Status: SHIPPED | OUTPATIENT
Start: 2023-05-30

## 2023-05-30 RX ORDER — PHENOBARBITAL 97.2 MG/1
TABLET ORAL
Qty: 30 TABLET | Refills: 0 | Status: SHIPPED | OUTPATIENT
Start: 2023-05-30

## 2023-05-30 NOTE — TELEPHONE ENCOUNTER
MEDICATION REFILL REQUEST    Caller: [facility] [id number] [place] [address] [address] [facility] AT [place] [phone] [place], [place] [phone] FX    Relationship: Pharmacy     Pharmacy representative's name: RUSSEL Clinton call back number: [phone]    Requested Prescriptions:   Requested Prescriptions     Pending Prescriptions Disp Refills   • PHENobarbital (LUMINAL) 97.2 MG tablet [Pharmacy Med Name: PHENobarbital 97.2 MG TABLET] 30 tablet      Sig: TAKE ONE TABLET BY MOUTH DAILY   • PHENobarbital (LUMINAL) 32.4 MG tablet [Pharmacy Med Name: PHENobarbital 32.4 MG TABLET] 30 tablet      Sig: TAKE ONE TABLET BY MOUTH DAILY      Pharmacy where request should be sent: [facility] [id number] [place] [address] [address] [facility] AT [place] [phone] [phone] FX     Last office visit with prescribing clinician: Visit date not found   Next office visit with prescribing clinician: 9/22/2023     Additional details provided by patient: RUSSEL CALLED TO CONFIRM THAT REFILL REQUEST WAS RECEIVED. I ADVISED YES. RUSSEL STATES PT IS COMPLETLEY OUT OF THE MEDICATIONS.    Does the patient have less than a 3 day supply?:  [x] Yes  [] No    Would you like a call back once the refill request has been completed?: [] Yes  [x] No    If the office needs to give you a call back, can they leave a voicemail?: [] Yes  [x] No    PLEASE REVIEW AND ADVISE.    Manisha Garcia Rep   05/30/23 08:10 EDT

## 2023-07-27 RX ORDER — PHENOBARBITAL 97.2 MG/1
TABLET ORAL
Qty: 30 TABLET | Refills: 0 | Status: SHIPPED | OUTPATIENT
Start: 2023-07-27

## 2023-07-27 RX ORDER — PHENOBARBITAL 32.4 MG/1
TABLET ORAL
Qty: 30 TABLET | Refills: 0 | Status: SHIPPED | OUTPATIENT
Start: 2023-07-27

## 2023-08-08 DIAGNOSIS — R41.89 BEHAVIOR RELATED TO COGNITIVE IMPAIRMENT: ICD-10-CM

## 2023-08-08 RX ORDER — DIAZEPAM 5 MG/1
TABLET ORAL
Qty: 4 TABLET | Refills: 0 | Status: SHIPPED | OUTPATIENT
Start: 2023-08-08

## 2023-08-22 ENCOUNTER — TELEPHONE (OUTPATIENT)
Dept: NEUROLOGY | Facility: CLINIC | Age: 53
End: 2023-08-22
Payer: MEDICARE

## 2023-08-22 RX ORDER — PHENOBARBITAL 97.2 MG/1
TABLET ORAL
Qty: 30 TABLET | Refills: 0 | Status: SHIPPED | OUTPATIENT
Start: 2023-08-22

## 2023-08-22 RX ORDER — PHENOBARBITAL 32.4 MG/1
TABLET ORAL
Qty: 30 TABLET | Refills: 0 | Status: SHIPPED | OUTPATIENT
Start: 2023-08-22

## 2023-08-22 NOTE — TELEPHONE ENCOUNTER
Provider: YVON  Caller: KAMILA  Relationship to Patient: MOTHER  [facility] [id number]  Phone Number: [phone]   Reason for Call: PT MOTHER CALLED AND STATES THAT THEY ARE LEAVING ON VACATION 08/29/23 AND PT IS GOING TO RUN OUT PHENOBARBITAL BOTH THE 32.4 AND 97.2 MG WHILE ON VACATION. MOTHER STATES THAT PHARMACY IS NEEDING  PERMISSION TO FILL MEDICATION EARLY.    PLEASE REVIEW AND ADVISE.  THANK YOU.

## 2023-08-24 NOTE — TELEPHONE ENCOUNTER
Provider: VINCENT SHEARER APRN    Caller: KAMILA    Relationship to Patient: MOTHER    Phone Number: [phone]    Reason for Call: STATED THE PHARMACY RECEIVED THE NEW SCRIPTS FOR PHENOBARBITAL.  STATED THE PHARMACY NEEDS A PHONE CALL TO OK AN EARLY REFILL.,    PLEASE CALL & ADVISE

## 2023-09-18 RX ORDER — OXYBUTYNIN CHLORIDE 10 MG/1
TABLET, EXTENDED RELEASE ORAL
Qty: 90 TABLET | Refills: 1 | Status: SHIPPED | OUTPATIENT
Start: 2023-09-18

## 2023-09-18 NOTE — TELEPHONE ENCOUNTER
Rx Refill Note  Requested Prescriptions     Pending Prescriptions Disp Refills    oxybutynin XL (DITROPAN-XL) 10 MG 24 hr tablet [Pharmacy Med Name: oxyBUTYnin CL ER 10 MG TABLET] 90 tablet 1     Sig: TAKE ONE TABLET BY MOUTH DAILY      Last office visit with prescribing clinician: 4/12/2022   Last telemedicine visit with prescribing clinician: 5/1/2023   Next office visit with prescribing clinician: Visit date not found                         Would you like a call back once the refill request has been completed: [] Yes [] No    If the office needs to give you a call back, can they leave a voicemail: [] Yes [] No    Ke Linn CMA/LMR  09/18/23, 10:15 EDT

## 2023-09-26 RX ORDER — PHENOBARBITAL 32.4 MG/1
TABLET ORAL
Qty: 30 TABLET | Refills: 0 | Status: SHIPPED | OUTPATIENT
Start: 2023-09-26 | End: 2023-10-01 | Stop reason: DRUGHIGH

## 2023-09-26 RX ORDER — PHENOBARBITAL 97.2 MG/1
TABLET ORAL
Qty: 30 TABLET | Refills: 0 | Status: SHIPPED | OUTPATIENT
Start: 2023-09-26 | End: 2023-10-01 | Stop reason: DRUGHIGH

## 2023-10-01 DIAGNOSIS — G40.109 SEIZURE, TEMPORAL LOBE: ICD-10-CM

## 2023-10-01 RX ORDER — PHENOBARBITAL 97.2 MG/1
97.2 TABLET ORAL DAILY
Qty: 31 TABLET | Refills: 5 | Status: SHIPPED | OUTPATIENT
Start: 2023-10-01

## 2023-10-01 RX ORDER — PHENOBARBITAL 32.4 MG/1
32.4 TABLET ORAL DAILY
Qty: 31 TABLET | Refills: 5 | Status: SHIPPED | OUTPATIENT
Start: 2023-10-01

## 2023-10-05 RX ORDER — PHENYTOIN 50 MG/1
TABLET, CHEWABLE ORAL
Qty: 90 TABLET | Refills: 3 | Status: SHIPPED | OUTPATIENT
Start: 2023-10-05

## 2024-02-16 ENCOUNTER — DOCUMENTATION ONLY (OUTPATIENT)
Dept: OTHER | Facility: CLINIC | Age: 54
End: 2024-02-16

## 2024-11-11 RX ORDER — OXYBUTYNIN CHLORIDE 10 MG/1
1 TABLET, EXTENDED RELEASE ORAL DAILY
COMMUNITY
Start: 2023-12-06

## 2024-11-11 RX ORDER — LORATADINE 10 MG/1
1 TABLET ORAL DAILY
COMMUNITY
Start: 2023-12-06

## 2024-11-11 RX ORDER — PHENOBARBITAL 32.4 MG/1
32.4 TABLET ORAL AT BEDTIME
COMMUNITY
Start: 2023-12-01

## 2024-11-11 RX ORDER — MULTIVITAMIN
1 TABLET ORAL DAILY
COMMUNITY
Start: 2023-12-06

## 2024-11-11 RX ORDER — PHENOBARBITAL 97.2 MG/1
97.2 TABLET ORAL AT BEDTIME
COMMUNITY
Start: 2023-12-01

## 2024-11-11 RX ORDER — LEVOTHYROXINE SODIUM 75 UG/1
75 TABLET ORAL
COMMUNITY
Start: 2023-12-06

## 2024-11-13 ENCOUNTER — HOSPITAL ENCOUNTER (OUTPATIENT)
Facility: CLINIC | Age: 54
Discharge: HOME | End: 2024-11-13
Attending: DENTIST | Admitting: DENTIST
Payer: COMMERCIAL

## 2024-11-13 ENCOUNTER — ANESTHESIA (OUTPATIENT)
Dept: SURGERY | Facility: CLINIC | Age: 54
End: 2024-11-13
Payer: COMMERCIAL

## 2024-11-13 ENCOUNTER — ANESTHESIA EVENT (OUTPATIENT)
Dept: SURGERY | Facility: CLINIC | Age: 54
End: 2024-11-13
Payer: COMMERCIAL

## 2024-11-13 VITALS
DIASTOLIC BLOOD PRESSURE: 96 MMHG | OXYGEN SATURATION: 99 % | TEMPERATURE: 97.6 F | WEIGHT: 115 LBS | SYSTOLIC BLOOD PRESSURE: 134 MMHG | HEART RATE: 74 BPM | RESPIRATION RATE: 18 BRPM

## 2024-11-13 LAB
ALBUMIN SERPL BCG-MCNC: 4.8 G/DL (ref 3.5–5.2)
ALP SERPL-CCNC: 104 U/L (ref 40–150)
ALT SERPL W P-5'-P-CCNC: 17 U/L (ref 0–50)
ANION GAP SERPL CALCULATED.3IONS-SCNC: 10 MMOL/L (ref 7–15)
AST SERPL W P-5'-P-CCNC: 21 U/L (ref 0–45)
BILIRUB SERPL-MCNC: 0.3 MG/DL
BUN SERPL-MCNC: 11.6 MG/DL (ref 6–20)
CALCIUM SERPL-MCNC: 9.6 MG/DL (ref 8.8–10.4)
CHLORIDE SERPL-SCNC: 101 MMOL/L (ref 98–107)
CREAT SERPL-MCNC: 0.76 MG/DL (ref 0.51–0.95)
EGFRCR SERPLBLD CKD-EPI 2021: >90 ML/MIN/1.73M2
GLUCOSE SERPL-MCNC: 106 MG/DL (ref 70–99)
HCO3 SERPL-SCNC: 28 MMOL/L (ref 22–29)
POTASSIUM SERPL-SCNC: 4.1 MMOL/L (ref 3.4–5.3)
PROT SERPL-MCNC: 7.8 G/DL (ref 6.4–8.3)
SODIUM SERPL-SCNC: 139 MMOL/L (ref 135–145)
TSH SERPL DL<=0.005 MIU/L-ACNC: 2.76 UIU/ML (ref 0.3–4.2)

## 2024-11-13 PROCEDURE — 82435 ASSAY OF BLOOD CHLORIDE: CPT | Performed by: DENTIST

## 2024-11-13 PROCEDURE — 250N000009 HC RX 250: Performed by: ANESTHESIOLOGY

## 2024-11-13 PROCEDURE — 84443 ASSAY THYROID STIM HORMONE: CPT | Performed by: DENTIST

## 2024-11-13 PROCEDURE — 710N000011 HC RECOVERY PHASE 1, LEVEL 3, PER MIN: Performed by: DENTIST

## 2024-11-13 PROCEDURE — 370N000017 HC ANESTHESIA TECHNICAL FEE, PER MIN: Performed by: DENTIST

## 2024-11-13 PROCEDURE — 250N000025 HC SEVOFLURANE, PER MIN: Performed by: DENTIST

## 2024-11-13 PROCEDURE — 258N000003 HC RX IP 258 OP 636: Performed by: NURSE ANESTHETIST, CERTIFIED REGISTERED

## 2024-11-13 PROCEDURE — 999N000141 HC STATISTIC PRE-PROCEDURE NURSING ASSESSMENT: Performed by: DENTIST

## 2024-11-13 PROCEDURE — 250N000013 HC RX MED GY IP 250 OP 250 PS 637: Performed by: DENTIST

## 2024-11-13 PROCEDURE — 710N000012 HC RECOVERY PHASE 2, PER MINUTE: Performed by: DENTIST

## 2024-11-13 PROCEDURE — 360N000075 HC SURGERY LEVEL 2, PER MIN: Performed by: DENTIST

## 2024-11-13 PROCEDURE — 80184 ASSAY OF PHENOBARBITAL: CPT | Performed by: DENTIST

## 2024-11-13 PROCEDURE — 250N000011 HC RX IP 250 OP 636: Performed by: NURSE ANESTHETIST, CERTIFIED REGISTERED

## 2024-11-13 PROCEDURE — 250N000009 HC RX 250: Performed by: NURSE ANESTHETIST, CERTIFIED REGISTERED

## 2024-11-13 RX ORDER — DEXAMETHASONE SODIUM PHOSPHATE 4 MG/ML
INJECTION, SOLUTION INTRA-ARTICULAR; INTRALESIONAL; INTRAMUSCULAR; INTRAVENOUS; SOFT TISSUE PRN
Status: DISCONTINUED | OUTPATIENT
Start: 2024-11-13 | End: 2024-11-13

## 2024-11-13 RX ORDER — PROPOFOL 10 MG/ML
INJECTION, EMULSION INTRAVENOUS PRN
Status: DISCONTINUED | OUTPATIENT
Start: 2024-11-13 | End: 2024-11-13

## 2024-11-13 RX ORDER — SODIUM CHLORIDE, SODIUM LACTATE, POTASSIUM CHLORIDE, CALCIUM CHLORIDE 600; 310; 30; 20 MG/100ML; MG/100ML; MG/100ML; MG/100ML
INJECTION, SOLUTION INTRAVENOUS CONTINUOUS PRN
Status: DISCONTINUED | OUTPATIENT
Start: 2024-11-13 | End: 2024-11-13

## 2024-11-13 RX ORDER — FENTANYL CITRATE 50 UG/ML
25 INJECTION, SOLUTION INTRAMUSCULAR; INTRAVENOUS EVERY 5 MIN PRN
Status: DISCONTINUED | OUTPATIENT
Start: 2024-11-13 | End: 2024-11-13 | Stop reason: HOSPADM

## 2024-11-13 RX ORDER — HYDROMORPHONE HYDROCHLORIDE 1 MG/ML
0.4 INJECTION, SOLUTION INTRAMUSCULAR; INTRAVENOUS; SUBCUTANEOUS EVERY 5 MIN PRN
Status: DISCONTINUED | OUTPATIENT
Start: 2024-11-13 | End: 2024-11-13 | Stop reason: HOSPADM

## 2024-11-13 RX ORDER — DIAZEPAM 10 MG/1
TABLET ORAL
COMMUNITY
Start: 2024-10-10

## 2024-11-13 RX ORDER — LIDOCAINE HYDROCHLORIDE 20 MG/ML
INJECTION, SOLUTION INFILTRATION; PERINEURAL PRN
Status: DISCONTINUED | OUTPATIENT
Start: 2024-11-13 | End: 2024-11-13

## 2024-11-13 RX ORDER — FENTANYL CITRATE 50 UG/ML
50 INJECTION, SOLUTION INTRAMUSCULAR; INTRAVENOUS EVERY 5 MIN PRN
Status: DISCONTINUED | OUTPATIENT
Start: 2024-11-13 | End: 2024-11-13 | Stop reason: HOSPADM

## 2024-11-13 RX ORDER — ONDANSETRON 4 MG/1
4 TABLET, ORALLY DISINTEGRATING ORAL EVERY 30 MIN PRN
Status: DISCONTINUED | OUTPATIENT
Start: 2024-11-13 | End: 2024-11-13 | Stop reason: HOSPADM

## 2024-11-13 RX ORDER — AMOXICILLIN 250 MG
1 CAPSULE ORAL AT BEDTIME
COMMUNITY

## 2024-11-13 RX ORDER — SODIUM CHLORIDE, SODIUM LACTATE, POTASSIUM CHLORIDE, CALCIUM CHLORIDE 600; 310; 30; 20 MG/100ML; MG/100ML; MG/100ML; MG/100ML
INJECTION, SOLUTION INTRAVENOUS CONTINUOUS
Status: DISCONTINUED | OUTPATIENT
Start: 2024-11-13 | End: 2024-11-13 | Stop reason: HOSPADM

## 2024-11-13 RX ORDER — ONDANSETRON 2 MG/ML
4 INJECTION INTRAMUSCULAR; INTRAVENOUS EVERY 30 MIN PRN
Status: DISCONTINUED | OUTPATIENT
Start: 2024-11-13 | End: 2024-11-13 | Stop reason: HOSPADM

## 2024-11-13 RX ORDER — KETAMINE HYDROCHLORIDE 100 MG/ML
100 INJECTION, SOLUTION INTRAMUSCULAR; INTRAVENOUS ONCE
Status: COMPLETED | OUTPATIENT
Start: 2024-11-13 | End: 2024-11-13

## 2024-11-13 RX ORDER — ONDANSETRON 2 MG/ML
INJECTION INTRAMUSCULAR; INTRAVENOUS PRN
Status: DISCONTINUED | OUTPATIENT
Start: 2024-11-13 | End: 2024-11-13

## 2024-11-13 RX ORDER — NALOXONE HYDROCHLORIDE 0.4 MG/ML
0.1 INJECTION, SOLUTION INTRAMUSCULAR; INTRAVENOUS; SUBCUTANEOUS
Status: DISCONTINUED | OUTPATIENT
Start: 2024-11-13 | End: 2024-11-13 | Stop reason: HOSPADM

## 2024-11-13 RX ORDER — PHENYTOIN 50 MG/1
50 TABLET, CHEWABLE ORAL AT BEDTIME
COMMUNITY
Start: 2024-08-30

## 2024-11-13 RX ORDER — FENTANYL CITRATE 50 UG/ML
INJECTION, SOLUTION INTRAMUSCULAR; INTRAVENOUS PRN
Status: DISCONTINUED | OUTPATIENT
Start: 2024-11-13 | End: 2024-11-13

## 2024-11-13 RX ORDER — MIDAZOLAM HYDROCHLORIDE 2 MG/ML
20 SYRUP ORAL ONCE
Status: DISCONTINUED | OUTPATIENT
Start: 2024-11-13 | End: 2024-11-13 | Stop reason: HOSPADM

## 2024-11-13 RX ORDER — CHLORHEXIDINE GLUCONATE ORAL RINSE 1.2 MG/ML
SOLUTION DENTAL PRN
Status: DISCONTINUED | OUTPATIENT
Start: 2024-11-13 | End: 2024-11-13 | Stop reason: HOSPADM

## 2024-11-13 RX ORDER — LIDOCAINE 40 MG/G
CREAM TOPICAL
Status: DISCONTINUED | OUTPATIENT
Start: 2024-11-13 | End: 2024-11-13 | Stop reason: HOSPADM

## 2024-11-13 RX ORDER — HYDROMORPHONE HYDROCHLORIDE 1 MG/ML
0.2 INJECTION, SOLUTION INTRAMUSCULAR; INTRAVENOUS; SUBCUTANEOUS EVERY 5 MIN PRN
Status: DISCONTINUED | OUTPATIENT
Start: 2024-11-13 | End: 2024-11-13 | Stop reason: HOSPADM

## 2024-11-13 RX ORDER — DEXAMETHASONE SODIUM PHOSPHATE 4 MG/ML
4 INJECTION, SOLUTION INTRA-ARTICULAR; INTRALESIONAL; INTRAMUSCULAR; INTRAVENOUS; SOFT TISSUE
Status: DISCONTINUED | OUTPATIENT
Start: 2024-11-13 | End: 2024-11-13 | Stop reason: HOSPADM

## 2024-11-13 RX ADMIN — FENTANYL CITRATE 25 MCG: 50 INJECTION INTRAMUSCULAR; INTRAVENOUS at 09:28

## 2024-11-13 RX ADMIN — DEXMEDETOMIDINE HYDROCHLORIDE 8 MCG: 100 INJECTION, SOLUTION INTRAVENOUS at 10:13

## 2024-11-13 RX ADMIN — LIDOCAINE HYDROCHLORIDE 40 MG: 20 INJECTION, SOLUTION INFILTRATION; PERINEURAL at 08:06

## 2024-11-13 RX ADMIN — KETAMINE HYDROCHLORIDE 100 MG: 100 INJECTION INTRAMUSCULAR; INTRAVENOUS at 07:32

## 2024-11-13 RX ADMIN — FENTANYL CITRATE 25 MCG: 50 INJECTION INTRAMUSCULAR; INTRAVENOUS at 08:39

## 2024-11-13 RX ADMIN — Medication 30 MG: at 08:06

## 2024-11-13 RX ADMIN — PROPOFOL 50 MG: 10 INJECTION, EMULSION INTRAVENOUS at 08:06

## 2024-11-13 RX ADMIN — FENTANYL CITRATE 50 MCG: 50 INJECTION INTRAMUSCULAR; INTRAVENOUS at 08:06

## 2024-11-13 RX ADMIN — SUGAMMADEX 100 MG: 100 INJECTION, SOLUTION INTRAVENOUS at 10:15

## 2024-11-13 RX ADMIN — SODIUM CHLORIDE, POTASSIUM CHLORIDE, SODIUM LACTATE AND CALCIUM CHLORIDE: 600; 310; 30; 20 INJECTION, SOLUTION INTRAVENOUS at 07:40

## 2024-11-13 RX ADMIN — ONDANSETRON 4 MG: 2 INJECTION INTRAMUSCULAR; INTRAVENOUS at 10:13

## 2024-11-13 RX ADMIN — DEXAMETHASONE SODIUM PHOSPHATE 8 MG: 4 INJECTION, SOLUTION INTRAMUSCULAR; INTRAVENOUS at 08:14

## 2024-11-13 ASSESSMENT — ACTIVITIES OF DAILY LIVING (ADL)
ADLS_ACUITY_SCORE: 0

## 2024-11-13 NOTE — ANESTHESIA PROCEDURE NOTES
Airway       Patient location during procedure: OR       Procedure Start/Stop Times: 11/13/2024 7:44 AM  Staff -        CRNA: Jenny Nugent APRN CRNA       Performed By: CRNA  Consent for Airway        Urgency: elective  Indications and Patient Condition       Indications for airway management: ran-procedural       Induction type:inhalational       Mask difficulty assessment: 1 - vent by mask    Final Airway Details       Final airway type: endotracheal airway       Successful airway: Nasal  Endotracheal Airway Details        ETT size (mm): 6.5       Cuffed: yes       Successful intubation technique: direct laryngoscopy       DL Blade Type: Kwok 2       Grade View of Cords: 1       Position: Center       Measured from: nares (right)       Secured at (cm): 27       Bite block used: None    Post intubation assessment        Placement verified by: capnometry, equal breath sounds and chest rise        Number of attempts at approach: 1       Secured with: tape       Ease of procedure: easy       Dentition: Intact and Unchanged    Medication(s) Administered   Medication Administration Time: 11/13/2024 7:44 AM    Additional Comments       Nares treated with afrin spray x 4, right nare dilated with nasal trumpet 30f, 32f, 34f

## 2024-11-13 NOTE — ANESTHESIA POSTPROCEDURE EVALUATION
Patient: Bette Mancera    Procedure: Procedure(s):  Bilateral Dental Exam, Radiograph, 3 Dental Restorations, Periodontal Therapy, Fluoride Varnish in the Mouth       Anesthesia Type:  General    Note:  Disposition: Outpatient   Postop Pain Control: Uneventful            Sign Out: Well controlled pain   PONV: No   Neuro/Psych: Uneventful            Sign Out: Acceptable/Baseline neuro status   Airway/Respiratory: Uneventful            Sign Out: Acceptable/Baseline resp. status   CV/Hemodynamics: Uneventful            Sign Out: Acceptable CV status; No obvious hypovolemia; No obvious fluid overload   Other NRE:    DID A NON-ROUTINE EVENT OCCUR?            Last vitals:  Vitals Value Taken Time   /96 11/13/24 1045   Temp 36.4  C (97.6  F) 11/13/24 1030   Pulse 74 11/13/24 1042   Resp 10 11/13/24 1042   SpO2 99 % 11/13/24 1044   Vitals shown include unfiled device data.    Electronically Signed By: Michelle Newsome MD  November 13, 2024  10:49 AM

## 2024-11-13 NOTE — ANESTHESIA CARE TRANSFER NOTE
Patient: Bette Mancera    Procedure: Procedure(s):  Bilateral Dental Exam, Radiograph, 3 Dental Restorations, Periodontal Therapy, Fluoride Varnish in the Mouth       Diagnosis: Dental caries [K02.9]  Diagnosis Additional Information: No value filed.    Anesthesia Type:   General     Note:    Oropharynx: oral airway in place  Level of Consciousness: drowsy  Oxygen Supplementation: face mask  Level of Supplemental Oxygen (L/min / FiO2): 6  Independent Airway: airway patency satisfactory and stable  Dentition: dentition unchanged S/P dental procedure  Vital Signs Stable: post-procedure vital signs reviewed and stable  Report to RN Given: handoff report given  Patient transferred to: PACU    Handoff Report: Identifed the Patient, Identified the Reponsible Provider, Reviewed the pertinent medical history, Discussed the surgical course, Reviewed Intra-OP anesthesia mangement and issues during anesthesia, Set expectations for post-procedure period and Allowed opportunity for questions and acknowledgement of understanding  Vitals:  Vitals Value Taken Time   /89 11/13/24 1032   Temp     Pulse 67 11/13/24 1035   Resp 11 11/13/24 1035   SpO2 100 % 11/13/24 1035   Vitals shown include unfiled device data.    Electronically Signed By: FLAVIO Jacob CRNA  November 13, 2024  10:35 AM

## 2024-11-13 NOTE — ANESTHESIA PREPROCEDURE EVALUATION
"Anesthesia Pre-Procedure Evaluation    Patient: Bette Mancera   MRN: [id number] : 1970        Procedure : Procedure(s):  Bilateral Dental Exam, Radiograph, Dental Restorations, Dental Extractions, Pulpotomy, Root Canal Therapy, Biopsy, Frenectomy, Gingivectomy, Alveoloplasty, Periodontal Therapy, Fluoride Varnish in the Mouth          Past Medical History:   Diagnosis Date    Cerebral palsy (H)     Seizures (H)       Past Surgical History:   Procedure Laterality Date    CATARACT EXTRACTION W/ INTRAOCULAR LENS IMPLANT Left 2019    EYE SURGERY      HC AMPUTATION TOE,I-P JOINT Right     2nd toe    ORTHOPEDIC SURGERY        Allergies   Allergen Reactions    Animal Dander     Molds & Smuts       Social History     Tobacco Use    Smoking status: Never    Smokeless tobacco: Never   Substance Use Topics    Alcohol use: Never      Wt Readings from Last 1 Encounters:   No data found for Wt        Anesthesia Evaluation            ROS/MED HX  ENT/Pulmonary:       Neurologic:     (+)                         Developmental delay,       Cardiovascular:       METS/Exercise Tolerance:     Hematologic:       Musculoskeletal:       GI/Hepatic:       Renal/Genitourinary:       Endo:       Psychiatric/Substance Use:       Infectious Disease:       Malignancy:       Other:            Physical Exam    Airway         TM distance: > 3 FB   Neck ROM: full   Mouth opening: > 3 cm    Respiratory Devices and Support         Dental       (+) Modest Abnormalities - crowns, retainers, 1 or 2 missing teeth      Cardiovascular   cardiovascular exam normal          Pulmonary   pulmonary exam normal                OUTSIDE LABS:  CBC: No results found for: \"WBC\", \"HGB\", \"HCT\", \"PLT\"  BMP: No results found for: \"NA\", \"POTASSIUM\", \"CHLORIDE\", \"CO2\", \"BUN\", \"CR\", \"GLC\"  COAGS: No results found for: \"PTT\", \"INR\", \"FIBR\"  POC: No results found for: \"BGM\", \"HCG\", \"HCGS\"  HEPATIC: No results found for: \"ALBUMIN\", \"PROTTOTAL\", \"ALT\", \"AST\", \"GGT\", " "\"ALKPHOS\", \"BILITOTAL\", \"BILIDIRECT\", \"ADELINE\"  OTHER: No results found for: \"PH\", \"LACT\", \"A1C\", \"ARACELY\", \"PHOS\", \"MAG\", \"LIPASE\", \"AMYLASE\", \"TSH\", \"T4\", \"T3\", \"CRP\", \"SED\"    Anesthesia Plan    ASA Status:  3    NPO Status:  NPO Appropriate    Anesthesia Type: General.              Consents    Anesthesia Plan(s) and associated risks, benefits, and realistic alternatives discussed. Questions answered and patient/representative(s) expressed understanding.     - Discussed: Risks, Benefits and Alternatives for the PROCEDURE were discussed     - Discussed with:  Parent (Mother and/or Father)      - Extended Intubation/Ventilatory Support Discussed: No.      - Patient is DNR/DNI Status: No     Use of blood products discussed: No .     Postoperative Care    Pain management: Multi-modal analgesia.   PONV prophylaxis: Ondansetron (or other 5HT-3)     Comments:    Other Comments: Ketamine 5 mg/kg I.M for preop sedation /Midazolam 20 mg PO           Michelle Newsome MD    I have reviewed the pertinent notes and labs in the chart from the past 30 days and (re)examined the patient.  Any updates or changes from those notes are reflected in this note.                                   "

## 2024-11-13 NOTE — ANESTHESIA POSTPROCEDURE EVALUATION
Patient: Bette Mancera    Procedure: Procedure(s):  Bilateral Dental Exam, Radiograph, 3 Dental Restorations, Periodontal Therapy, Fluoride Varnish in the Mouth       Anesthesia Type:  General    Note:  Disposition: Outpatient   Postop Pain Control: Uneventful            Sign Out: Well controlled pain   PONV: No   Neuro/Psych: Uneventful            Sign Out: Acceptable/Baseline neuro status   Airway/Respiratory: Uneventful            Sign Out: Acceptable/Baseline resp. status   CV/Hemodynamics: Uneventful            Sign Out: Acceptable CV status; No obvious hypovolemia; No obvious fluid overload   Other NRE:    DID A NON-ROUTINE EVENT OCCUR?            Last vitals:  Vitals Value Taken Time   /96 11/13/24 1045   Temp 36.4  C (97.6  F) 11/13/24 1030   Pulse 74 11/13/24 1045   Resp 16 11/13/24 1045   SpO2 99 % 11/13/24 1045       Electronically Signed By: Michelle Newsome MD  November 13, 2024  3:04 PM

## 2024-11-13 NOTE — BRIEF OP NOTE
[facility]    Brief Operative Note    Pre-operative diagnosis: Dental caries [K02.9]  Post-operative diagnosis Same as pre-operative diagnosis    Procedure: Bilateral Dental Exam, Radiograph, 3 Dental Restorations, Periodontal Therapy, Fluoride Varnish in the Mouth, N/A - Mouth    Surgeon: Surgeons and Role:     * Kaitlynn Cantu DDS - Primary     * Hanna Sarah MD - Resident - Assisting  Anesthesia: General   Estimated Blood Loss: 2ml    Drains: None  Specimens: * No specimens in log *  Findings:   None.  Complications: None.  Implants: * No implants in log *

## 2024-11-13 NOTE — OR NURSING
Pt is combative with cares, this RN is unable to assess pt, do vital signs or get an accurate weight. Weight documented is an estimate per mother/ Legal Guardian, Dr José is aware of above. Outside lab orders placed in epic for intraoperative lab draw. Hard copy of 2 labs in paper chart. Lab tubes at bedside.

## 2024-11-14 NOTE — OP NOTE
[facility]   OR Dental Procedure Note    Patient:   Bette Mancera    Date of birth 1970   MRN:  [id number]   Date of Visit:  11/14/2024   Date of Admission 11/13/2024     Treatment Completed   General Anesthesia Start:  Bette Mancera is a 54 year old female brought into the operating room and draped in the usual customary [facility] fashion. Following the time-out procedures, the patient was placed under general anesthesia care via Right naris.    Under GA, the following treatments were performed:   Bilateral dental examination,   FMX, Bitewings x 4, Anterior PA x 6, and Posterior PA x 2 radiographs were taken and reviewed.  Moist throat pack was placed, betadine applied circumferentially to oral cavity.   Pre-procedural rinse included: Chlorhexidine 0.12% solution followed by a 50/50 solution of sterile saline and hydrogen peroxide.    Periodontal Treatment: Full mouth prophylaxis: Bulk debridement completed with the Cavitron, followed use of hand scalers as necessary. Slow speed polish with medium grit polishing paste. All contacts flossed.      Composite Restoration(s):  Tooth #8-upper right central incisor, #9-upper left central incisor, and #19-lower left 1st molar prepared with the electric hand pieces and hand instruments as necessary to remove decay and ensure retention of restoration, preparation cleansed with 38% Phosphoric etch, followed by two applications of 3M Scotchbond Universal adhesive, and restored with 3M Filtek composite shade A2, all materials used per  instructions. Restoration(s) polished with high and slow speed handpiece to minimize occlusal interference and sharp edges and contacts flossed as needed.         Sodium Fluoride Varnish 5% placed on remaining teeth.     Throat pack placed at: 0837  Throat pack removed at: 1013  The oropharynx was inspected and found to be clear.   Estimated blood loss:  2 (mL)      Dental procedure Finish:  After the dental procedure, the patient was transferred to recovery room in good condition under the lead of the CRNA.The patient s family was informed by the dental team about the dental findings and procedures performed. Verbal and written post-op instructions given. All questions and concerns were invited and answered, patient and patient's family left pleased with treatment.       Clinic contact information:   [facility]  [facility] [address] [address]  Phone:[phone]    Pre - Procedure   Patient name: Bette Mancera  : 1970  Medical record number:  [id number]  Dental procedures performed on: 2024  It was deemed necessary for this patient to be seen in the hospital operating room because pt is not able to be seen in clinic due to: Neurodevelopmental disorder    Pre-procedure with patient & guardian:   Consent: Risks complications including but not limited to post-operative pain, swelling, bleeding, infection, temporary/permanent paresthesia/anesthesia of CN V3, lingual nerve, failure to resolve chief complaint. Patient /'s guardian(s) agreed to procedure as written and signed consent after all questions were invited and answered.    Providers     Dental attending:Kaitlynn Cantu DDS, N Dental Faculty  Dental resident Hanna Sarah MD, DMD, Fellow  Anesthesiologist:   CRNA:Jenny  Anesthesiology resident:  Nacho RN: Inés Parker RN: Nelson      Patient review   Patient Health history: History is obtained from the patient's parent(s)  The 10 point Review of Systems is negative other than noted in the HPI and pertinent information mentioned above.     History of Present Illness:  Bette Mancera is a 54 year old female who presents to the OR for comprehensive dental treatment.     ASA 3 - Patient with moderate systemic  disease with functional limitations    Physical Exam:  Vitals were reviewed  Temp: 97.6  F (36.4  C) Temp src: Axillary BP: (!) 134/96 Pulse: 74   Resp: 18 SpO2: 99 % O2 Device: None (Room air) Oxygen Delivery: 6 LPM    Medical, Surgical, Social History       Past Medical History:   Diagnosis Date    Cerebral palsy (H)     Seizures (H)          Past Surgical History:   Procedure Laterality Date    CATARACT EXTRACTION W/ INTRAOCULAR LENS IMPLANT Left 2019    EYE SURGERY      HC AMPUTATION TOE,I-P JOINT Right     2nd toe    ORTHOPEDIC SURGERY           Social History     Tobacco Use    Smoking status: Never    Smokeless tobacco: Never   Substance Use Topics    Alcohol use: Never         History reviewed. No pertinent family history.      Immunizations and Allergies     Immunization History   Administered Date(s) Administered    COVID-19 12+ (MODERNA) 10/07/2024         Allergies   Allergen Reactions    Animal Dander     Molds & Smuts          Medication     Prior to Admission Medications       No current facility-administered medications for this encounter.     Current Outpatient Medications   Medication Sig Dispense Refill    diazepam (VALIUM) 10 MG tablet TAKE 1 TABLET (10 MG) BY MOUTH ONE TIME FOR 1 DOSE.      levothyroxine (SYNTHROID/LEVOTHROID) 75 MCG tablet Take 75 mcg by mouth.      loratadine (CLARITIN) 10 MG tablet Take 1 tablet by mouth daily.      Multiple Vitamin (ONE-A-DAY ESSENTIAL) TABS Take 1 tablet by mouth daily.      oxyBUTYnin ER (DITROPAN XL) 10 MG 24 hr tablet Take 1 tablet by mouth daily.      PHENobarbital 32.4 MG tablet Take 32.4 mg by mouth at bedtime.      PHENobarbital 97.2 MG tablet Take 97.2 mg by mouth at bedtime.      PHENYTOIN INFATABS 50 MG chewable tablet Take 50 mg by mouth at bedtime.      senna-docusate (SENOKOT-S/PERICOLACE) 8.6-50 MG tablet Take 1 tablet by mouth at bedtime.           Exam and Assessment    Bette Mancera is a 54 year old female that presented to the OR at  [facility] due to Pre-procedure diagnosis: Cracked tooth, K03.81.  and Dental caries, unspecified, K02.9     Extra-Oral: No submandibular lymphadenopathy, no induration or erythema, no abnormal skin lesions, inferior border of mandible is palpable bilaterally, SAVANNAH >45mm. No physical impediment from TMJ bilaterally. No clicking of TMJ on opening and lateral movements.    Intraoral exam: Reveals mild plaque, mild calculus, mild bleeding on probing. Generalized wear on posterior and anterior.       Probing depth range (mm):  Upper right: 2-3  Upper left: 2-3  Lower left: 2-5  Lower right: 2-4    Radiographic exam: Radiographs revealed no periradicular or periapical radiolucent lesions  associated with teeth,  mild RBL noted   Abnormal findings include:     The post-operative diagnosis was Dental caries, unspecified, K02.9

## 2024-11-22 LAB
PHENOBARB FREE SERPL-MCNC: 14 MCG/ML
PHENOBARB SERPL-MCNC: 31 MCG/ML
PHENOBARBITAL BOUND, S/P: 17 MCG/ML

## 2024-12-15 ENCOUNTER — HEALTH MAINTENANCE LETTER (OUTPATIENT)
Age: 54
End: 2024-12-15

## 2025-01-19 ENCOUNTER — HEALTH MAINTENANCE LETTER (OUTPATIENT)
Age: 55
End: 2025-01-19

## (undated) DEVICE — GLV SURG SENSICARE GREEN W/ALOE PF LF 6.5 STRL

## (undated) DEVICE — ANTIFOG SOLUTION W/FOAM PAD 31142527

## (undated) DEVICE — SYR EAR 3OZ BULB IRR STRL DISP BLU PVC 4173

## (undated) DEVICE — BASIN SET MAJOR

## (undated) DEVICE — ST IRR CYSTO W/SPK 77IN LF

## (undated) DEVICE — SOL HYDROGEN PEROXIDE 3% 4OZ BOTTLE F0010

## (undated) DEVICE — GOWN IMPERVIOUS SPECIALTY XLG/XLONG 32474

## (undated) DEVICE — PAD SANI MAXI W/ADHS SNG WRP 11IN

## (undated) DEVICE — PREP POVIDONE IODINE SWABSTICKS TRIPLE PACK MDS093902A

## (undated) DEVICE — POSITIONER ARMBOARD FOAM 1PAIR LF FP-ARMB1

## (undated) DEVICE — STRAP KNEE/BODY 31143004

## (undated) DEVICE — SPONGE RAY-TEC 4X8" 7318

## (undated) DEVICE — LINEN GOWN X4 5410

## (undated) DEVICE — SUCTION TIP YANKAUER W/O VENT K86

## (undated) DEVICE — LINEN ORTHO PACK 5446

## (undated) DEVICE — GLOVE BIOGEL PI MICRO INDICATOR UNDERGLOVE SZ 7.5 48975

## (undated) DEVICE — LIGHT HANDLE X2

## (undated) DEVICE — TOOTHBRUSH ADULT NON STERILE MDS136850

## (undated) DEVICE — LABEL MEDICATION SYSTEM 3303-P

## (undated) DEVICE — GLV SURG BIOGEL LTX PF 6

## (undated) DEVICE — GLOVE BIOGEL PI MICRO SZ 7.0 48570

## (undated) DEVICE — OINTMENT ANTIBIOTIC BACITRACIN ZINC .9 G 1171

## (undated) DEVICE — LOU D & C HYSTEROSCOPY: Brand: MEDLINE INDUSTRIES, INC.

## (undated) DEVICE — PREP POVIDONE-IODINE 10% SOLUTION 4OZ BOTTLE MDS093944

## (undated) DEVICE — PACK SET-UP STD 9102

## (undated) DEVICE — SOLIDIFIER (USE FOR UP TO 1500CC) MSOLID1500

## (undated) DEVICE — SOL WATER IRRIG 1000ML BOTTLE 2F7114

## (undated) RX ORDER — DEXAMETHASONE SODIUM PHOSPHATE 4 MG/ML
INJECTION, SOLUTION INTRA-ARTICULAR; INTRALESIONAL; INTRAMUSCULAR; INTRAVENOUS; SOFT TISSUE
Status: DISPENSED
Start: 2024-11-13

## (undated) RX ORDER — CHLORHEXIDINE GLUCONATE ORAL RINSE 1.2 MG/ML
SOLUTION DENTAL
Status: DISPENSED
Start: 2024-11-13

## (undated) RX ORDER — FENTANYL CITRATE 50 UG/ML
INJECTION, SOLUTION INTRAMUSCULAR; INTRAVENOUS
Status: DISPENSED
Start: 2024-11-13

## (undated) RX ORDER — OXYMETAZOLINE HYDROCHLORIDE 0.05 G/100ML
SPRAY NASAL
Status: DISPENSED
Start: 2024-11-13

## (undated) RX ORDER — PROPOFOL 10 MG/ML
INJECTION, EMULSION INTRAVENOUS
Status: DISPENSED
Start: 2024-11-13

## (undated) RX ORDER — SODIUM CHLORIDE, SODIUM LACTATE, POTASSIUM CHLORIDE, CALCIUM CHLORIDE 600; 310; 30; 20 MG/100ML; MG/100ML; MG/100ML; MG/100ML
INJECTION, SOLUTION INTRAVENOUS
Status: DISPENSED
Start: 2024-11-13

## (undated) RX ORDER — ONDANSETRON 2 MG/ML
INJECTION INTRAMUSCULAR; INTRAVENOUS
Status: DISPENSED
Start: 2024-11-13